# Patient Record
Sex: FEMALE | Race: WHITE | NOT HISPANIC OR LATINO | Employment: FULL TIME | URBAN - METROPOLITAN AREA
[De-identification: names, ages, dates, MRNs, and addresses within clinical notes are randomized per-mention and may not be internally consistent; named-entity substitution may affect disease eponyms.]

---

## 2023-11-03 PROBLEM — E04.9 GOITER: Status: ACTIVE | Noted: 2021-08-04

## 2023-11-03 PROBLEM — N85.01 ENDOMETRIAL HYPERPLASIA, COMPLEX: Status: ACTIVE | Noted: 2023-11-03

## 2023-11-03 PROBLEM — R73.01 IMPAIRED FASTING GLUCOSE: Status: ACTIVE | Noted: 2020-02-12

## 2023-11-03 PROBLEM — E66.01 OBESITY, CLASS III, BMI 40-49.9 (MORBID OBESITY) (MULTI): Status: ACTIVE | Noted: 2023-11-03

## 2023-11-03 PROBLEM — K80.20 GALLSTONES: Status: ACTIVE | Noted: 2018-08-15

## 2023-11-03 PROBLEM — E88.819 INSULIN RESISTANCE: Status: ACTIVE | Noted: 2023-11-03

## 2023-11-03 PROBLEM — N97.0 SECONDARY ANOVULATORY INFERTILITY: Status: ACTIVE | Noted: 2023-11-03

## 2023-11-03 PROBLEM — I10 PRIMARY HYPERTENSION: Status: ACTIVE | Noted: 2023-11-03

## 2023-11-03 PROBLEM — E04.2 NONTOXIC MULTINODULAR GOITER: Status: ACTIVE | Noted: 2023-11-03

## 2023-11-03 PROBLEM — E28.2 PCOS (POLYCYSTIC OVARIAN SYNDROME): Status: ACTIVE | Noted: 2023-11-03

## 2023-11-03 PROBLEM — E66.01 MORBID (SEVERE) OBESITY DUE TO EXCESS CALORIES (MULTI): Status: ACTIVE | Noted: 2023-11-03

## 2023-11-03 PROBLEM — N96 RECURRENT PREGNANCY LOSS WITHOUT CURRENT PREGNANCY: Status: ACTIVE | Noted: 2023-11-03

## 2023-11-03 PROBLEM — R00.2 PALPITATIONS: Status: ACTIVE | Noted: 2018-11-09

## 2023-11-03 PROBLEM — E66.813 OBESITY, CLASS III, BMI 40-49.9 (MORBID OBESITY): Status: ACTIVE | Noted: 2023-11-03

## 2023-11-03 PROBLEM — E74.39 GLUCOSE INTOLERANCE: Status: ACTIVE | Noted: 2023-11-03

## 2023-11-03 PROBLEM — N97.9 FEMALE INFERTILITY: Status: ACTIVE | Noted: 2023-11-03

## 2023-11-03 PROBLEM — E04.2 MULTIPLE THYROID NODULES: Status: ACTIVE | Noted: 2023-02-08

## 2023-11-03 PROBLEM — R01.1 HEART MURMUR: Status: ACTIVE | Noted: 2018-11-09

## 2023-11-03 PROBLEM — Z86.32 HX GESTATIONAL DIABETES: Status: ACTIVE | Noted: 2023-11-03

## 2023-11-03 PROBLEM — K21.9 CHRONIC GERD: Status: ACTIVE | Noted: 2018-08-08

## 2023-11-03 PROBLEM — K80.50 BILIARY COLIC: Status: ACTIVE | Noted: 2023-11-03

## 2023-11-03 PROBLEM — E03.8 SUBCLINICAL HYPOTHYROIDISM: Status: ACTIVE | Noted: 2018-11-19

## 2023-11-03 PROBLEM — N85.00 ENDOMETRIAL HYPERPLASIA: Status: ACTIVE | Noted: 2023-11-03

## 2023-11-03 RX ORDER — AMLODIPINE BESYLATE 5 MG/1
5 TABLET ORAL DAILY
COMMUNITY
Start: 2023-02-06 | End: 2024-02-09 | Stop reason: SDUPTHER

## 2023-11-03 RX ORDER — LEVOTHYROXINE SODIUM 50 UG/1
50 TABLET ORAL
COMMUNITY
End: 2023-12-18 | Stop reason: WASHOUT

## 2023-11-03 RX ORDER — ONDANSETRON 4 MG/1
4 TABLET, ORALLY DISINTEGRATING ORAL EVERY 6 HOURS PRN
COMMUNITY
End: 2023-12-18 | Stop reason: WASHOUT

## 2023-11-03 RX ORDER — SEMAGLUTIDE 1.34 MG/ML
1 INJECTION, SOLUTION SUBCUTANEOUS
COMMUNITY
Start: 2023-09-18 | End: 2023-12-18 | Stop reason: ALTCHOICE

## 2023-11-03 RX ORDER — MEDROXYPROGESTERONE ACETATE 10 MG/1
10 TABLET ORAL DAILY
COMMUNITY
Start: 2022-12-22 | End: 2024-06-05 | Stop reason: SDUPTHER

## 2023-11-03 RX ORDER — PRENATAL VIT 49/IRON FUM/FOLIC 6.75-0.2MG
TABLET ORAL
COMMUNITY
End: 2023-12-18 | Stop reason: WASHOUT

## 2023-11-03 RX ORDER — METFORMIN HYDROCHLORIDE 500 MG/1
500 TABLET ORAL 3 TIMES DAILY
COMMUNITY
Start: 2018-08-08

## 2023-11-03 RX ORDER — DICYCLOMINE HYDROCHLORIDE 10 MG/1
10 CAPSULE ORAL 3 TIMES DAILY
COMMUNITY
End: 2023-12-18 | Stop reason: WASHOUT

## 2023-11-03 RX ORDER — SEMAGLUTIDE 0.68 MG/ML
0.25 INJECTION, SOLUTION SUBCUTANEOUS
COMMUNITY
Start: 2023-06-12 | End: 2023-12-18 | Stop reason: ALTCHOICE

## 2023-11-06 NOTE — PROGRESS NOTES
"Patient ID: Karlee Beck is a 38 y.o. female.  Referring Physician: No referring provider defined for this encounter.  Primary Care Provider: No primary care provider on file.    Subjective    Interval History:    She is excited to travel to [place] for Thanksgiving, bowel movements and appetite are normal she was started on Ozempic managed by her endocrinologist, her weight is decreasing but is experiencing acid reflux, LMP 2023, she asked about side effects and her treatment plan/schedule. Denies thyroid issues, Hx of taking Levothyroxine, and nodules were found in her thyroid and she was told not to worry about it. Discussed Provera vs hysterectomy, stress management, recalls her last PAP was in .           Tumor History:  - 2020: CAH bordering on G1 EAC, on Megace  - 2021: EMB with progesterone effect   - 2021: EMB with progesterone effect  - 2021: EMB with hyperplasia without atypia, MARIA EUGENIA 3 noted underwent subsequent pap and ECC which were wnl  - 2022: EMB with secretory endometrium   - 10/22: Focal endometrial hyperplasia  - : EMB benign     Past medical history:  PCO S or \"prediabetes\".  Hypothyroid, treated medically  GERD  Morbid obesity        Prior surgery  Cholecystectomy  [place] teeth removal   section     OB/GYN history:   4 para 1031  Possible polycystic ovary syndrome.  Desires fertility preservation.  Would like to have another child.  Endometrial polyp suspected on saline sonohysterogram  and later undergoing polypectomy showing background endometrium with hyperplasia without atypia in May 2020  Began Provera 10 mg per day in 2020 after being referred to Dr. Evette Fournier  Endometrial biopsy 2020 showing atypical endometrial hyperplasia bordering on grade 1 cancer.     Family history:  Paternal grandmother with brain cancer  Maternal aunt with breast cancer, possibly premenopausal    Objective    BSA: 2.93 meters squared  /82 (BP " "Location: Left arm, Patient Position: Sitting, BP Cuff Size: Large adult long)   Pulse 86   Temp 36.3 °C (97.3 °F) (Temporal)   Resp 17   Ht (S) 1.76 m (5' 9.29\")   Wt (!) 176 kg (387 lb 7.3 oz)   SpO2 97%   BMI 56.74 kg/m²      Physical Exam  Constitutional:       Appearance: Normal appearance. She is normal weight.   HENT:      Head: Normocephalic.      Mouth/Throat:      Mouth: Mucous membranes are moist.   Eyes:      Pupils: Pupils are equal, round, and reactive to light.   Cardiovascular:      Rate and Rhythm: Normal rate and regular rhythm.      Heart sounds: No murmur heard.     No friction rub. No gallop.   Pulmonary:      Effort: Pulmonary effort is normal.      Breath sounds: Normal breath sounds.   Abdominal:      General: Abdomen is flat. Bowel sounds are normal.      Palpations: Abdomen is soft.   Genitourinary:     Comments: Normal external genitalia without lesions or masses  Speculum Exam: Notable for a small amount of old bloody discharge noted in the vagina and on the cervix, cervix is normal in appearance, vagina is normal in appearance.  Endometrial biopsy: Cervix cleansed with betadine, the cervix was grasped with a tenaculum on the anterior lip, The endometrial pipelle was inserted and 2 passes made. The specimen was sent for permanent section  The tenaculum was removed and hemostasis noted. She tolerated the procedure well     Musculoskeletal:         General: No swelling.   Skin:     General: Skin is warm and dry.   Neurological:      Mental Status: She is alert.         Performance Status:  Asymptomatic    Assessment/Plan   38 y.o. 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 Last EMB benign     # Atypical hyperplasia  - Discussed plan for provera 10mg daily  - EMB today, will call patient with results   - F/u visit in 6 months with biopsy at that time as long as biopsy today wnl  - She " is still intermittently deciding if she will pursue pregnancy, will continue to follow up     # Screening  - Due for a PAP test in 12/2024 and will order mammogram at next visit.        Scribe Attestation  By signing my name below, I, Eder Bradford   attest that this documentation has been prepared under the direction and in the presence of Demetria Arreguin MD.    Provider Attestation - Scribe documentation    All medical record entries made by the Scribe were at my direction and personally dictated by me. I have reviewed the chart and agree that the record accurately reflects my personal performance of the history, physical exam, discussion and plan.    Demetria Arreguin MD

## 2023-11-07 ENCOUNTER — OFFICE VISIT (OUTPATIENT)
Dept: GYNECOLOGIC ONCOLOGY | Facility: CLINIC | Age: 38
End: 2023-11-07
Payer: COMMERCIAL

## 2023-11-07 VITALS
HEIGHT: 69 IN | SYSTOLIC BLOOD PRESSURE: 137 MMHG | OXYGEN SATURATION: 97 % | WEIGHT: 293 LBS | HEART RATE: 86 BPM | DIASTOLIC BLOOD PRESSURE: 82 MMHG | BODY MASS INDEX: 43.4 KG/M2 | TEMPERATURE: 97.3 F | RESPIRATION RATE: 17 BRPM

## 2023-11-07 DIAGNOSIS — E66.01 MORBID (SEVERE) OBESITY DUE TO EXCESS CALORIES (MULTI): ICD-10-CM

## 2023-11-07 DIAGNOSIS — N85.01 ENDOMETRIAL HYPERPLASIA, COMPLEX: Primary | ICD-10-CM

## 2023-11-07 PROCEDURE — 3079F DIAST BP 80-89 MM HG: CPT | Performed by: STUDENT IN AN ORGANIZED HEALTH CARE EDUCATION/TRAINING PROGRAM

## 2023-11-07 PROCEDURE — 88305 TISSUE EXAM BY PATHOLOGIST: CPT | Mod: TC,SUR | Performed by: STUDENT IN AN ORGANIZED HEALTH CARE EDUCATION/TRAINING PROGRAM

## 2023-11-07 PROCEDURE — 88305 TISSUE EXAM BY PATHOLOGIST: CPT | Mod: TC | Performed by: STUDENT IN AN ORGANIZED HEALTH CARE EDUCATION/TRAINING PROGRAM

## 2023-11-07 PROCEDURE — 99214 OFFICE O/P EST MOD 30 MIN: CPT | Mod: 25 | Performed by: STUDENT IN AN ORGANIZED HEALTH CARE EDUCATION/TRAINING PROGRAM

## 2023-11-07 PROCEDURE — 99214 OFFICE O/P EST MOD 30 MIN: CPT | Performed by: STUDENT IN AN ORGANIZED HEALTH CARE EDUCATION/TRAINING PROGRAM

## 2023-11-07 PROCEDURE — 88305 TISSUE EXAM BY PATHOLOGIST: CPT | Performed by: PATHOLOGY

## 2023-11-07 PROCEDURE — 3075F SYST BP GE 130 - 139MM HG: CPT | Performed by: STUDENT IN AN ORGANIZED HEALTH CARE EDUCATION/TRAINING PROGRAM

## 2023-11-07 ASSESSMENT — PAIN SCALES - GENERAL: PAINLEVEL: 0-NO PAIN

## 2023-11-10 ENCOUNTER — TELEPHONE (OUTPATIENT)
Dept: GYNECOLOGIC ONCOLOGY | Facility: HOSPITAL | Age: 38
End: 2023-11-10

## 2023-11-10 LAB
LABORATORY COMMENT REPORT: NORMAL
PATH REPORT.FINAL DX SPEC: NORMAL
PATH REPORT.GROSS SPEC: NORMAL
PATH REPORT.RELEVANT HX SPEC: NORMAL
PATH REPORT.TOTAL CANCER: NORMAL

## 2023-11-10 NOTE — TELEPHONE ENCOUNTER
Phoned patient to notify her that EMB was negative and that Dr. Arreguin recommends follow up in 6 months.    Messaged Miroslava Crooks requesting she schedule 6 month follow up appointment.

## 2023-12-18 ENCOUNTER — OFFICE VISIT (OUTPATIENT)
Dept: ENDOCRINOLOGY | Facility: CLINIC | Age: 38
End: 2023-12-18
Payer: COMMERCIAL

## 2023-12-18 VITALS
DIASTOLIC BLOOD PRESSURE: 82 MMHG | SYSTOLIC BLOOD PRESSURE: 136 MMHG | BODY MASS INDEX: 55.65 KG/M2 | HEART RATE: 80 BPM | WEIGHT: 293 LBS

## 2023-12-18 DIAGNOSIS — E28.2 PCOS (POLYCYSTIC OVARIAN SYNDROME): ICD-10-CM

## 2023-12-18 DIAGNOSIS — R73.01 IMPAIRED FASTING GLUCOSE: ICD-10-CM

## 2023-12-18 DIAGNOSIS — E04.1 THYROID NODULE: ICD-10-CM

## 2023-12-18 DIAGNOSIS — E66.01 OBESITY, CLASS III, BMI 40-49.9 (MORBID OBESITY) (MULTI): Primary | ICD-10-CM

## 2023-12-18 DIAGNOSIS — E53.8 VITAMIN B 12 DEFICIENCY: ICD-10-CM

## 2023-12-18 DIAGNOSIS — Z86.32 HX GESTATIONAL DIABETES: ICD-10-CM

## 2023-12-18 DIAGNOSIS — Z13.220 SCREENING FOR LIPID DISORDERS: ICD-10-CM

## 2023-12-18 PROCEDURE — 3079F DIAST BP 80-89 MM HG: CPT | Performed by: NURSE PRACTITIONER

## 2023-12-18 PROCEDURE — 3075F SYST BP GE 130 - 139MM HG: CPT | Performed by: NURSE PRACTITIONER

## 2023-12-18 PROCEDURE — 99214 OFFICE O/P EST MOD 30 MIN: CPT | Performed by: NURSE PRACTITIONER

## 2023-12-18 RX ORDER — SEMAGLUTIDE 1.34 MG/ML
1 INJECTION, SOLUTION SUBCUTANEOUS
Qty: 9 ML | Refills: 3 | Status: SHIPPED | OUTPATIENT
Start: 2023-12-18 | End: 2024-12-17

## 2023-12-18 ASSESSMENT — LIFESTYLE VARIABLES
HOW OFTEN DO YOU HAVE A DRINK CONTAINING ALCOHOL: 2-4 TIMES A MONTH
HOW MANY STANDARD DRINKS CONTAINING ALCOHOL DO YOU HAVE ON A TYPICAL DAY: 1 OR 2

## 2023-12-18 ASSESSMENT — PAIN SCALES - GENERAL: PAINLEVEL: 0-NO PAIN

## 2023-12-18 ASSESSMENT — PATIENT HEALTH QUESTIONNAIRE - PHQ9
1. LITTLE INTEREST OR PLEASURE IN DOING THINGS: NOT AT ALL
2. FEELING DOWN, DEPRESSED OR HOPELESS: NOT AT ALL
SUM OF ALL RESPONSES TO PHQ9 QUESTIONS 1 & 2: 0

## 2023-12-18 NOTE — PROGRESS NOTES
HPI:  Presents for follow up. 37 yo with obesity, PCOS, Impaired fasting glucose, gestational diabetes, HTN, Multinodular Goiter.Previously seen Dr. King for PCOS, last OV 2019.  Before Dr. King, was under the care of End[facility] Fertility Specialist, was trying to have another child for the past 13 years, was found to have precancerous uterine polyps, taking progesterone. Will eventually need a hysterectomy.  Euthyroid, no compressive/obstructive neck complaints.  Thyroid US (Feb 2023) showing two nodules 5 x 5 x 5 mm left thyroid gland, no cystic component, no calcifications, larger nodule 8 x 4 x 5 mm, again no cyctic component, no calcifiactions. TSH 2.03 Was taking Levothyroxine 50mcg daily, 7 pills a week, while seeing an infertility specialist, no longer taking.   Currently taking Ozempic 1 mg weekly, has lost about 15 pounds since she started. Sometimes has GI side effects, which resolve.                   -PCOS Metformin max dose         -HTN Amlodipine 5mg         -WEIGHT HISTORY struggled with obesity most of her life, before pregnancy 260 lbs, lowest weight 240lbs, most weight lost at one time while in college 40pounds.         -eats 3 meals a day         -drinks only water and coffee, no sugary drinks, no pop         -excercises often takes brisk walks or follows [employer]         -emotional eater         -has tried carb counting WW/[employer] not affordable         -sleep apnea screening negative         -no depression history         -no cushinoid characteristics    /82 (BP Location: Left arm, Patient Position: Sitting)   Pulse 80   Wt (!) 172 kg (380 lb)   BMI 55.65 kg/m²     Labs:  Lab Results   Component Value Date    WBC 10.0 12/17/2022    NRBC 0 12/17/2022    RBC 4.71 12/17/2022    HGB 12.3 12/17/2022    HCT 39.9 12/17/2022     12/17/2022     Lab Results   Component Value Date    CALCIUM 9.3 12/17/2022    AST 19 12/17/2022    ALKPHOS 70 12/17/2022    BILITOT 0.9 12/17/2022    PROT  7.4 12/17/2022    ALBUMIN 4.0 12/17/2022    GLOB 3.4 12/17/2022    AGR 1.2 (L) 12/17/2022     12/17/2022    K 5.0 12/17/2022     12/17/2022    CO2 23 (L) 12/17/2022    ANIONGAP 12 12/17/2022    BUN 10 12/17/2022    CREATININE 0.6 12/17/2022    UREACREAUR 16.7 12/17/2022    GLUCOSE 93 12/17/2022    ALT 21 12/17/2022    EGFR 118 12/17/2022     Lab Results   Component Value Date    CHOL 178 12/17/2022    TRIG 103 12/17/2022    HDL 37 (L) 12/17/2022    LDLCALC 120 12/17/2022       Lab Results   Component Value Date    TSH 2.03 02/07/2023       Lab Results   Component Value Date    HGBA1C 5.4 12/17/2022         Current Outpatient Medications:     amLODIPine (Norvasc) 5 mg tablet, Take 1 tablet (5 mg) by mouth once daily., Disp: , Rfl:     cyanocobalamin, vitamin B-12, (VITAMIN B-12 ORAL), B12, Disp: , Rfl:     medroxyPROGESTERone (Provera) 10 mg tablet, Take 1 tablet (10 mg) by mouth once daily., Disp: , Rfl:     metFORMIN (Glucophage) 500 mg tablet, Take 1 tablet (500 mg) by mouth 3 times a day., Disp: , Rfl:     multivit-min/ferrous fumarate (MULTI VITAMIN ORAL), 0 Refill(s), Type: Maintenance, Disp: , Rfl:     semaglutide (Ozempic) 1 mg/dose (4 mg/3 mL) pen injector, Inject 1 mg under the skin 1 (one) time per week., Disp: 9 mL, Rfl: 3    Review of Systems:  Cardiology: Lightheadedness-denies.  Chest pain-denies.  Leg edema-denies.  Palpitations-denies.  Respiratory: Cough-denies. Shortness of breath-denies.  Wheezing-denies.  Gastroenterology: Constipation-denies.  Diarrhea-denies.  Heartburn-denies.  Endocrinology: Cold intolerance-denies.  Heat intolerance-denies.  Sweats-denies.  Neurology: Headache-denies.  Tremor-denies.  Neuropathy in extremities-denies.  Psychology: Low energy-denies.  Irritability-denies.  Sleep disturbances-denies.    Physical Exam:  General Appearance: pleasant, cooperative, no acute distress  HEENT: no chemosis, no proptosis, no lid lag, no lid retraction  Neck: no  lymphadenopathy, no thyromegaly, no dominant thyroid nodules  Heart: no murmurs, regular rate and rhythm, S1 and S2  Lungs: no wheezes, no rhonci, no rales  Extremities: no lower extremity swelling    Assessment and Plan:  1. Obesity, Class III, BMI 40-49.9 (morbid obesity) (CMS/[facility])  -tolerating GLP1, continue 1 mg dose for now  -some GI side effects that resolve with time  -reviewed reportable side effects    2. PCOS (polycystic ovarian syndrome)  -maintained on Metformin  -check B12 level    3. Hx gestational diabetes      4. Impaired fasting glucose  -in between PCP will have all labs done for her before her next visit in April    Follow Up: CNP 4 months    -labs/tests/notes reviewed  -reviewed and counseled patient on medication monitoring and side effects

## 2024-02-09 ENCOUNTER — TELEPHONE (OUTPATIENT)
Dept: PRIMARY CARE | Facility: CLINIC | Age: 39
End: 2024-02-09
Payer: COMMERCIAL

## 2024-02-09 DIAGNOSIS — I10 PRIMARY HYPERTENSION: Primary | ICD-10-CM

## 2024-02-09 DIAGNOSIS — E03.8 SUBCLINICAL HYPOTHYROIDISM: ICD-10-CM

## 2024-02-09 DIAGNOSIS — R73.01 IMPAIRED FASTING GLUCOSE: ICD-10-CM

## 2024-02-09 RX ORDER — AMLODIPINE BESYLATE 5 MG/1
5 TABLET ORAL DAILY
Qty: 30 TABLET | Refills: 0 | Status: SHIPPED | OUTPATIENT
Start: 2024-02-09 | End: 2024-03-07 | Stop reason: SDUPTHER

## 2024-02-16 ENCOUNTER — HOSPITAL ENCOUNTER (OUTPATIENT)
Dept: RADIOLOGY | Facility: HOSPITAL | Age: 39
Discharge: HOME | End: 2024-02-16
Payer: COMMERCIAL

## 2024-02-16 ENCOUNTER — LAB (OUTPATIENT)
Dept: LAB | Facility: LAB | Age: 39
End: 2024-02-16
Payer: COMMERCIAL

## 2024-02-16 DIAGNOSIS — Z13.220 SCREENING FOR LIPID DISORDERS: ICD-10-CM

## 2024-02-16 DIAGNOSIS — R73.01 IMPAIRED FASTING GLUCOSE: ICD-10-CM

## 2024-02-16 DIAGNOSIS — E04.1 THYROID NODULE: ICD-10-CM

## 2024-02-16 DIAGNOSIS — E03.8 SUBCLINICAL HYPOTHYROIDISM: ICD-10-CM

## 2024-02-16 DIAGNOSIS — E66.01 OBESITY, CLASS III, BMI 40-49.9 (MORBID OBESITY) (MULTI): ICD-10-CM

## 2024-02-16 DIAGNOSIS — I10 PRIMARY HYPERTENSION: ICD-10-CM

## 2024-02-16 DIAGNOSIS — E53.8 VITAMIN B 12 DEFICIENCY: ICD-10-CM

## 2024-02-16 DIAGNOSIS — Z86.32 HX GESTATIONAL DIABETES: ICD-10-CM

## 2024-02-16 LAB
ALBUMIN SERPL BCP-MCNC: 4 G/DL (ref 3.4–5)
ALP SERPL-CCNC: 58 U/L (ref 33–110)
ALT SERPL W P-5'-P-CCNC: 14 U/L (ref 7–45)
ANION GAP SERPL CALC-SCNC: 11 MMOL/L (ref 10–20)
APPEARANCE UR: ABNORMAL
AST SERPL W P-5'-P-CCNC: 13 U/L (ref 9–39)
BACTERIA #/AREA URNS AUTO: ABNORMAL /HPF
BASOPHILS # BLD AUTO: 0.03 X10*3/UL (ref 0–0.1)
BASOPHILS NFR BLD AUTO: 0.3 %
BILIRUB SERPL-MCNC: 0.9 MG/DL (ref 0–1.2)
BILIRUB UR STRIP.AUTO-MCNC: NEGATIVE MG/DL
BUN SERPL-MCNC: 12 MG/DL (ref 6–23)
CALCIUM SERPL-MCNC: 9.1 MG/DL (ref 8.6–10.3)
CHLORIDE SERPL-SCNC: 105 MMOL/L (ref 98–107)
CHOLEST SERPL-MCNC: 152 MG/DL (ref 0–199)
CHOLESTEROL/HDL RATIO: 4.3
CO2 SERPL-SCNC: 25 MMOL/L (ref 21–32)
COLOR UR: YELLOW
CREAT SERPL-MCNC: 0.63 MG/DL (ref 0.5–1.05)
EGFRCR SERPLBLD CKD-EPI 2021: >90 ML/MIN/1.73M*2
EOSINOPHIL # BLD AUTO: 0.57 X10*3/UL (ref 0–0.7)
EOSINOPHIL NFR BLD AUTO: 6.4 %
ERYTHROCYTE [DISTWIDTH] IN BLOOD BY AUTOMATED COUNT: 14.2 % (ref 11.5–14.5)
EST. AVERAGE GLUCOSE BLD GHB EST-MCNC: 105 MG/DL
GLUCOSE SERPL-MCNC: 85 MG/DL (ref 74–99)
GLUCOSE UR STRIP.AUTO-MCNC: NEGATIVE MG/DL
HBA1C MFR BLD: 5.3 %
HCT VFR BLD AUTO: 41.1 % (ref 36–46)
HDLC SERPL-MCNC: 35.4 MG/DL
HGB BLD-MCNC: 13 G/DL (ref 12–16)
IMM GRANULOCYTES # BLD AUTO: 0.03 X10*3/UL (ref 0–0.7)
IMM GRANULOCYTES NFR BLD AUTO: 0.3 % (ref 0–0.9)
KETONES UR STRIP.AUTO-MCNC: NEGATIVE MG/DL
LDLC SERPL CALC-MCNC: 98 MG/DL
LEUKOCYTE ESTERASE UR QL STRIP.AUTO: ABNORMAL
LYMPHOCYTES # BLD AUTO: 2.26 X10*3/UL (ref 1.2–4.8)
LYMPHOCYTES NFR BLD AUTO: 25.4 %
MCH RBC QN AUTO: 26.6 PG (ref 26–34)
MCHC RBC AUTO-ENTMCNC: 31.6 G/DL (ref 32–36)
MCV RBC AUTO: 84 FL (ref 80–100)
MONOCYTES # BLD AUTO: 0.43 X10*3/UL (ref 0.1–1)
MONOCYTES NFR BLD AUTO: 4.8 %
MUCOUS THREADS #/AREA URNS AUTO: ABNORMAL /LPF
NEUTROPHILS # BLD AUTO: 5.57 X10*3/UL (ref 1.2–7.7)
NEUTROPHILS NFR BLD AUTO: 62.8 %
NITRITE UR QL STRIP.AUTO: NEGATIVE
NON HDL CHOLESTEROL: 117 MG/DL (ref 0–149)
NRBC BLD-RTO: 0 /100 WBCS (ref 0–0)
PH UR STRIP.AUTO: 5 [PH]
PLATELET # BLD AUTO: 366 X10*3/UL (ref 150–450)
POTASSIUM SERPL-SCNC: 4.4 MMOL/L (ref 3.5–5.3)
PROT SERPL-MCNC: 7.1 G/DL (ref 6.4–8.2)
PROT UR STRIP.AUTO-MCNC: ABNORMAL MG/DL
RBC # BLD AUTO: 4.88 X10*6/UL (ref 4–5.2)
RBC # UR STRIP.AUTO: NEGATIVE /UL
RBC #/AREA URNS AUTO: ABNORMAL /HPF
SODIUM SERPL-SCNC: 137 MMOL/L (ref 136–145)
SP GR UR STRIP.AUTO: 1.02
SQUAMOUS #/AREA URNS AUTO: ABNORMAL /HPF
TRIGL SERPL-MCNC: 91 MG/DL (ref 0–149)
TSH SERPL-ACNC: 2.7 MIU/L (ref 0.44–3.98)
UROBILINOGEN UR STRIP.AUTO-MCNC: <2 MG/DL
VIT B12 SERPL-MCNC: 371 PG/ML (ref 211–911)
VLDL: 18 MG/DL (ref 0–40)
WBC # BLD AUTO: 8.9 X10*3/UL (ref 4.4–11.3)
WBC #/AREA URNS AUTO: ABNORMAL /HPF

## 2024-02-16 PROCEDURE — 83036 HEMOGLOBIN GLYCOSYLATED A1C: CPT

## 2024-02-16 PROCEDURE — 85025 COMPLETE CBC W/AUTO DIFF WBC: CPT

## 2024-02-16 PROCEDURE — 84443 ASSAY THYROID STIM HORMONE: CPT

## 2024-02-16 PROCEDURE — 80061 LIPID PANEL: CPT

## 2024-02-16 PROCEDURE — 80053 COMPREHEN METABOLIC PANEL: CPT

## 2024-02-16 PROCEDURE — 76536 US EXAM OF HEAD AND NECK: CPT

## 2024-02-16 PROCEDURE — 82607 VITAMIN B-12: CPT

## 2024-02-16 PROCEDURE — 81001 URINALYSIS AUTO W/SCOPE: CPT

## 2024-03-05 PROBLEM — K80.20 GALLSTONES: Status: RESOLVED | Noted: 2018-08-15 | Resolved: 2024-03-05

## 2024-03-05 PROBLEM — N96 RECURRENT PREGNANCY LOSS WITHOUT CURRENT PREGNANCY: Status: RESOLVED | Noted: 2023-11-03 | Resolved: 2024-03-05

## 2024-03-05 PROBLEM — R00.2 PALPITATIONS: Status: RESOLVED | Noted: 2018-11-09 | Resolved: 2024-03-05

## 2024-03-05 PROBLEM — E66.813 OBESITY, CLASS III, BMI 40-49.9 (MORBID OBESITY): Status: RESOLVED | Noted: 2023-11-03 | Resolved: 2024-03-05

## 2024-03-05 PROBLEM — E88.819 INSULIN RESISTANCE: Status: RESOLVED | Noted: 2023-11-03 | Resolved: 2024-03-05

## 2024-03-05 PROBLEM — K80.50 BILIARY COLIC: Status: RESOLVED | Noted: 2023-11-03 | Resolved: 2024-03-05

## 2024-03-05 PROBLEM — E66.01 OBESITY, CLASS III, BMI 40-49.9 (MORBID OBESITY) (MULTI): Status: RESOLVED | Noted: 2023-11-03 | Resolved: 2024-03-05

## 2024-03-05 PROBLEM — N85.01 ENDOMETRIAL HYPERPLASIA, COMPLEX: Status: RESOLVED | Noted: 2023-11-03 | Resolved: 2024-03-05

## 2024-03-05 NOTE — PROGRESS NOTES
"Subjective   Patient ID: Karlee Beck is a 39 y.o. female who presents for Annual Exam.    HPI   Karlee presents for CPE. She was previously a patient of [facility].  She has not been here in over a year.   PMH and H reviewed and updated. 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  She is  and has a 14-year-old daughter. She has no specific concerns today.  She feels she may have ADHD but is not interested in medications.  Her daughter was recently diagnosed with ADHD.  Predraw is reviewed.  All is unremarkable.  Consultants:   Sees a therapist in [place].                        Endocrinology for obesity, polycystic ovary syndrome, impaired fasting glucose and goiter    GYN- She sees a gynecologist. She has seen an oncologist due to uterine hyperplasia   She has a history of infertility.  She is on cyclic progesterone to keep her uterine lining from building up.      Review of Systems  Constitutional Symptoms: She is negative for fever, loss of appetite, headaches, fatigue.   Eyes: She is negative for loss and blurring of vision  Cardiovascular: She is negative for chest pain palpitations, edema, claudication.   Respiratory: She is negative for shortness of breath, coughing.   Gastrointestinal: She is negative for indigestion, abdominal pain,  blood in stool.   Female Genitourinary: She is negative for  incontinence, frequency, nocturia, dysuria  Musculoskeletal: She is negative for joint pain, joint swelling, myalgias, cramps.   Integumentary: She is negative for change in mole, skin trouble or rash, hives, itching,   Neurological: She is negative for numbness, tingling, weakness, dizziness, memory loss.   Psychiatric: She does have some anxiety but feels that it is controlled.  She reads and does crafts to relax.  Endocrine: She is negative for weight gain, excessive sweating, polyuria, polydipsia, polyphagia.       Objective   /88   Pulse 78   Ht 1.753 m (5' 9\")   Wt (!) 170 kg (375 " lb)   SpO2 100%   BMI 55.38 kg/m²     Physical Exam  General Appearance: Comfortable. She is well nourished, and well developed.   Eyes: PERRLA, EOMI, conjunctiva and sclerae clear.  Ears, Nose, Mouth, Throat: Bilateral canals are normal. Both tympanic membranes are pearly gray and landmarks normal . Posterior pharynx WNL. Tonsils WNL.   NOSE: Nasal mucosa in both nostrils reveals no polyps, ulcerations, or lesions. Oral mucosa reveals no abnormalities   Neck: Neck reveals supple, no adenopathy, no thyromegaly, or carotid bruits.  Chest: Lungs are clear to auscultation bilaterally with no wheezes, rales, or rhonchi.  Cardiovascular: RRR without MRG. No edema.  DP/PT palpable bilaterally.   Abdomen: Abdomen is soft, NT, ND with no masses. No guarding or rigidity  Musculoskeletal: 5/5 and equal strength in bilateral upper and lower extremities.  Skin: Skin reveals good turgor and no rashes .  Neurological: Neuro: Intact and non-focal. Cranial nerves II - XII are grossly intact.  Psychiatric:  Patient's mood is appropriate.      Assessment/Plan   Diagnoses and all orders for this visit:  Well adult exam  Primary hypertension  -     amLODIPine (Norvasc) 5 mg tablet; Take 1 tablet (5 mg) by mouth once daily.  -     ECG 12 lead (Clinic Performed)  Visit for screening mammogram

## 2024-03-07 ENCOUNTER — OFFICE VISIT (OUTPATIENT)
Dept: PRIMARY CARE | Facility: CLINIC | Age: 39
End: 2024-03-07
Payer: COMMERCIAL

## 2024-03-07 VITALS
SYSTOLIC BLOOD PRESSURE: 138 MMHG | BODY MASS INDEX: 43.4 KG/M2 | WEIGHT: 293 LBS | DIASTOLIC BLOOD PRESSURE: 88 MMHG | HEART RATE: 78 BPM | HEIGHT: 69 IN | OXYGEN SATURATION: 100 %

## 2024-03-07 DIAGNOSIS — Z00.00 WELL ADULT EXAM: Primary | ICD-10-CM

## 2024-03-07 DIAGNOSIS — I10 PRIMARY HYPERTENSION: ICD-10-CM

## 2024-03-07 PROCEDURE — 93000 ELECTROCARDIOGRAM COMPLETE: CPT | Performed by: FAMILY MEDICINE

## 2024-03-07 PROCEDURE — 1036F TOBACCO NON-USER: CPT | Performed by: FAMILY MEDICINE

## 2024-03-07 PROCEDURE — 3075F SYST BP GE 130 - 139MM HG: CPT | Performed by: FAMILY MEDICINE

## 2024-03-07 PROCEDURE — 99396 PREV VISIT EST AGE 40-64: CPT | Performed by: FAMILY MEDICINE

## 2024-03-07 PROCEDURE — 3079F DIAST BP 80-89 MM HG: CPT | Performed by: FAMILY MEDICINE

## 2024-03-07 RX ORDER — AMLODIPINE BESYLATE 5 MG/1
5 TABLET ORAL DAILY
Qty: 30 TABLET | Refills: 0 | OUTPATIENT
Start: 2024-03-07

## 2024-03-07 RX ORDER — AMLODIPINE BESYLATE 5 MG/1
5 TABLET ORAL DAILY
Qty: 90 TABLET | Refills: 3 | Status: SHIPPED | OUTPATIENT
Start: 2024-03-07

## 2024-03-07 ASSESSMENT — PROMIS GLOBAL HEALTH SCALE
RATE_GENERAL_HEALTH: GOOD
RATE_QUALITY_OF_LIFE: GOOD
RATE_MENTAL_HEALTH: FAIR
RATE_SOCIAL_SATISFACTION: FAIR
RATE_PHYSICAL_HEALTH: GOOD
CARRYOUT_SOCIAL_ACTIVITIES: FAIR
CARRYOUT_PHYSICAL_ACTIVITIES: COMPLETELY
RATE_AVERAGE_FATIGUE: MILD
RATE_AVERAGE_PAIN: 0
EMOTIONAL_PROBLEMS: SOMETIMES

## 2024-03-07 ASSESSMENT — PATIENT HEALTH QUESTIONNAIRE - PHQ9
SUM OF ALL RESPONSES TO PHQ9 QUESTIONS 1 AND 2: 0
2. FEELING DOWN, DEPRESSED OR HOPELESS: NOT AT ALL
1. LITTLE INTEREST OR PLEASURE IN DOING THINGS: NOT AT ALL

## 2024-03-07 ASSESSMENT — PAIN SCALES - GENERAL: PAINLEVEL: 0-NO PAIN

## 2024-03-07 NOTE — PATIENT INSTRUCTIONS
It was nice to meet you Karlee.  Continue your current medications.  As long as your consultants are seeing you regularly and checking blood pressures I will plan to see you back in 1 year.

## 2024-04-18 ENCOUNTER — OFFICE VISIT (OUTPATIENT)
Dept: ENDOCRINOLOGY | Facility: CLINIC | Age: 39
End: 2024-04-18
Payer: COMMERCIAL

## 2024-04-18 VITALS
DIASTOLIC BLOOD PRESSURE: 76 MMHG | HEART RATE: 80 BPM | WEIGHT: 293 LBS | SYSTOLIC BLOOD PRESSURE: 128 MMHG | BODY MASS INDEX: 54.61 KG/M2

## 2024-04-18 DIAGNOSIS — E66.01 OBESITY, CLASS III, BMI 40-49.9 (MORBID OBESITY) (MULTI): ICD-10-CM

## 2024-04-18 DIAGNOSIS — E04.1 THYROID NODULE: ICD-10-CM

## 2024-04-18 DIAGNOSIS — E28.2 PCOS (POLYCYSTIC OVARIAN SYNDROME): ICD-10-CM

## 2024-04-18 DIAGNOSIS — E88.819 INSULIN RESISTANCE: Primary | ICD-10-CM

## 2024-04-18 PROCEDURE — 99213 OFFICE O/P EST LOW 20 MIN: CPT | Performed by: NURSE PRACTITIONER

## 2024-04-18 PROCEDURE — 3074F SYST BP LT 130 MM HG: CPT | Performed by: NURSE PRACTITIONER

## 2024-04-18 PROCEDURE — 3078F DIAST BP <80 MM HG: CPT | Performed by: NURSE PRACTITIONER

## 2024-04-18 ASSESSMENT — ENCOUNTER SYMPTOMS: DEPRESSION: 0

## 2024-04-18 ASSESSMENT — PAIN SCALES - GENERAL: PAINLEVEL: 0-NO PAIN

## 2024-04-18 NOTE — PROGRESS NOTES
HPI   Presents for follow up. 38 yo with Insulin Resistance, obesity, PCOS, gestational diabetes, HTN, Multinodular Goiter. Previously seen Dr. King for PCOS, last OV 2019.  Before Dr. King, was under the care of Endo Fertility Specialist, was trying to have another child for the past 13 years, was found to have precancerous uterine polyps, taking progesterone. Will eventually need a hysterectomy. Euthyroid, no compressive/obstructive neck complaints.  Thyroid US (Feb 2023) showing two nodules 5 x 5 x 5 mm left thyroid gland, no cystic component, no calcifications, larger nodule 8 x 4 x 5 mm, again no cyctic component, no calcifiactions. TSH 2.03 Was taking Levothyroxine 50mcg daily, 7 pills a week, while seeing an infertility specialist, no longer taking.   Currently taking Ozempic 1 mg weekly, start weight 390 pounds 369.8 Sometimes has GI side effects, which resolve.       Current Outpatient Medications:     amLODIPine (Norvasc) 5 mg tablet, Take 1 tablet (5 mg) by mouth once daily., Disp: 90 tablet, Rfl: 3    cyanocobalamin, vitamin B-12, (VITAMIN B-12 ORAL), B12, Disp: , Rfl:     medroxyPROGESTERone (Provera) 10 mg tablet, Take 1 tablet (10 mg) by mouth once daily., Disp: , Rfl:     metFORMIN (Glucophage) 500 mg tablet, Take 1 tablet (500 mg) by mouth 3 times a day., Disp: , Rfl:     multivit-min/ferrous fumarate (MULTI VITAMIN ORAL), 0 Refill(s), Type: Maintenance, Disp: , Rfl:     semaglutide (Ozempic) 1 mg/dose (4 mg/3 mL) pen injector, Inject 1 mg under the skin 1 (one) time per week., Disp: 9 mL, Rfl: 3      Allergies as of 04/18/2024 - Reviewed 04/18/2024   Allergen Reaction Noted    Azithromycin Insomnia, Other, and Unknown 11/03/2023    Potassium sulfate Unknown 11/03/2023         Review of Systems   Cardiology: Lightheadedness-denies.  Chest pain-denies.  Leg edema-denies.  Palpitations-denies.  Respiratory: Cough-denies. Shortness of breath-denies.  Wheezing-denies.  Gastroenterology:  Constipation-denies.  Diarrhea-denies.  Heartburn-denies.  Endocrinology: Cold intolerance-denies.  Heat intolerance-denies.  Sweats-denies.  Neurology: Headache-denies.  Tremor-denies.  Neuropathy in extremities-denies.  Psychology: Low energy-denies.  Irritability-denies.  Sleep disturbances-denies.      /76 (BP Location: Left arm, Patient Position: Sitting)   Pulse 80   Wt (!) 168 kg (369 lb 12.8 oz)   BMI 54.61 kg/m²       Labs:  Lab Results   Component Value Date    WBC 8.9 02/16/2024    NRBC 0.0 02/16/2024    RBC 4.88 02/16/2024    HGB 13.0 02/16/2024    HCT 41.1 02/16/2024     02/16/2024     Lab Results   Component Value Date    CALCIUM 9.1 02/16/2024    AST 13 02/16/2024    ALKPHOS 58 02/16/2024    BILITOT 0.9 02/16/2024    PROT 7.1 02/16/2024    ALBUMIN 4.0 02/16/2024    GLOB 3.4 12/17/2022    AGR 1.2 (L) 12/17/2022     02/16/2024    K 4.4 02/16/2024     02/16/2024    CO2 25 02/16/2024    ANIONGAP 11 02/16/2024    BUN 12 02/16/2024    CREATININE 0.63 02/16/2024    UREACREAUR 16.7 12/17/2022    GLUCOSE 85 02/16/2024    ALT 14 02/16/2024    EGFR >90 02/16/2024     Lab Results   Component Value Date    CHOL 152 02/16/2024    TRIG 91 02/16/2024    HDL 35.4 02/16/2024    LDLCALC 98 02/16/2024     Lab Results   Component Value Date    TSH 2.70 02/16/2024     Lab Results   Component Value Date    LCWJMZNN27 371 02/16/2024     Lab Results   Component Value Date    HGBA1C 5.3 02/16/2024         Physical Exam   General Appearance: pleasant, cooperative, no acute distress  HEENT: no chemosis, no proptosis, no lid lag, no lid retraction  Neck: no lymphadenopathy, no thyromegaly, no dominant thyroid nodules  Heart: no murmurs, regular rate and rhythm, S1 and S2  Lungs: no wheezes, no rhonci, no rales  Extremities: no lower extremity swelling      Assessment/Plan   1. Insulin resistance  -continue with GLP1 therapy  -can consider increase dose to 2 mg slowly to prevent undesired side effects  and maximize weight loss  -continues with Metformin 1.5 mg daily     2. PCOS (polycystic ovarian syndrome)  -maintains Metformin    3. Obesity, Class III, BMI 40-49.9 (morbid obesity) (Multi)  -continue with GLP1 treatment and plan to maximize dose      4. Thyroid Nodule  -no concerning changes from most recent US  -can plan to repeat in 3 yeas or sooner if develops obstructive symptoms    Follow Up: 4 months CNP    -labs/tests/notes reviewed  -reviewed and counseled patient on medication monitoring and side effects

## 2024-05-13 NOTE — PROGRESS NOTES
"Patient ID: Karlee Beck is a 39 y.o. female.  Referring Physician: No referring provider defined for this encounter.  Primary Care Provider: Divya Kent MD    Subjective    Interval History:  She had 2 full period cycles, denies spotting and lower extremity edema, otherwise she requested a mammogram order.    She denies fever, chills, chest pain, SOB, nausea, vomiting, diarrhea, constipation, dysuria, or any other concerning signs of symptoms.      Tumor History:  - 2020: CAH bordering on G1 EAC, on Megace  - 2021: EMB with progesterone effect   - 2021: EMB with progesterone effect  - 2021: EMB with hyperplasia without atypia, MARIA EUGENIA 3 noted underwent subsequent pap and ECC which were wnl  - 2022: EMB with secretory endometrium   - 10/22: Focal endometrial hyperplasia  - : EMB benign  -  EMB benign     Past medical history:  PCO S or \"prediabetes\".  Hypothyroid, treated medically  GERD  Morbid obesity        Prior surgery  Cholecystectomy  [place] teeth removal   section     OB/GYN history:   4 para 1031  Possible polycystic ovary syndrome.  Desires fertility preservation.  Would like to have another child.  Endometrial polyp suspected on saline sonohysterogram  and later undergoing polypectomy showing background endometrium with hyperplasia without atypia in May 2020  Began Provera 10 mg per day in 2020 after being referred to Dr. Evette Fournier  Endometrial biopsy 2020 showing atypical endometrial hyperplasia bordering on grade 1 cancer.     Family history:  Paternal grandmother with brain cancer  Maternal aunt with breast cancer, possibly premenopausal    Objective    BSA: 2.85 meters squared  /83 (BP Location: Right arm, Patient Position: Sitting, BP Cuff Size: Large adult)   Pulse 78   Temp 36.6 °C (97.9 °F) (Temporal)   Resp 18   Wt (!) 167 kg (369 lb) Comment: Stated Weight  SpO2 97%   BMI 54.49 kg/m²      Physical Exam  Constitutional:       " Appearance: Normal appearance. She is normal weight.   HENT:      Head: Normocephalic.      Mouth/Throat:      Mouth: Mucous membranes are moist.   Eyes:      Pupils: Pupils are equal, round, and reactive to light.   Cardiovascular:      Rate and Rhythm: Normal rate and regular rhythm.      Heart sounds: No murmur heard.     No friction rub. No gallop.   Pulmonary:      Effort: Pulmonary effort is normal.      Breath sounds: Normal breath sounds.   Abdominal:      General: Abdomen is flat. Bowel sounds are normal.      Palpations: Abdomen is soft.   Genitourinary:     Comments: Normal external genitalia without lesions or masses  Speculum Exam: Notable for a small amount of old bloody discharge noted in the vagina and on the cervix, cervix is normal in appearance, vagina is normal in appearance.  Endometrial biopsy: Cervix cleansed with betadine, the cervix was grasped with a tenaculum on the anterior lip, The endometrial pipelle was inserted and 2 passes made. The specimen was sent for permanent section  The tenaculum was removed and hemostasis noted. She tolerated the procedure well     Musculoskeletal:         General: No swelling.   Skin:     General: Skin is warm and dry.   Neurological:      Mental Status: She is alert.         Performance Status:  Asymptomatic    Assessment/Plan   39 y.o. 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 Last EMB benign     # Atypical hyperplasia  - Discussed plan for provera 10mg daily  - EMB today, will call patient with results   - F/u visit in 6 months, if EMB today wnl will plan on yearly EMB  - She is still intermittently deciding if she will pursue pregnancy, will continue to follow up     # Screening  - Due for a PAP test in 12/2026  - Mammogram ordered      Scribe Attestation  By signing my name below, [email or web address], Scribe   attest that this documentation has been  prepared under the direction and in the presence of Demetria Arreguin MD.     Provider Attestation - Scribe documentation    All medical record entries made by the Scribe were at my direction and personally dictated by me. I have reviewed the chart and agree that the record accurately reflects my personal performance of the history, physical exam, discussion and plan.    Demetria Arreguin MD

## 2024-05-14 ENCOUNTER — OFFICE VISIT (OUTPATIENT)
Dept: GYNECOLOGIC ONCOLOGY | Facility: CLINIC | Age: 39
End: 2024-05-14
Payer: COMMERCIAL

## 2024-05-14 VITALS
RESPIRATION RATE: 18 BRPM | WEIGHT: 293 LBS | OXYGEN SATURATION: 97 % | SYSTOLIC BLOOD PRESSURE: 136 MMHG | HEART RATE: 78 BPM | BODY MASS INDEX: 54.49 KG/M2 | TEMPERATURE: 97.9 F | DIASTOLIC BLOOD PRESSURE: 83 MMHG

## 2024-05-14 DIAGNOSIS — Z12.39 BREAST SCREENING: ICD-10-CM

## 2024-05-14 DIAGNOSIS — N85.00 ENDOMETRIAL HYPERPLASIA: Primary | ICD-10-CM

## 2024-05-14 PROCEDURE — 58100 BIOPSY OF UTERUS LINING: CPT | Performed by: STUDENT IN AN ORGANIZED HEALTH CARE EDUCATION/TRAINING PROGRAM

## 2024-05-14 PROCEDURE — 88305 TISSUE EXAM BY PATHOLOGIST: CPT | Performed by: PATHOLOGY

## 2024-05-14 PROCEDURE — 99214 OFFICE O/P EST MOD 30 MIN: CPT | Performed by: STUDENT IN AN ORGANIZED HEALTH CARE EDUCATION/TRAINING PROGRAM

## 2024-05-14 PROCEDURE — 3075F SYST BP GE 130 - 139MM HG: CPT | Performed by: STUDENT IN AN ORGANIZED HEALTH CARE EDUCATION/TRAINING PROGRAM

## 2024-05-14 PROCEDURE — 3079F DIAST BP 80-89 MM HG: CPT | Performed by: STUDENT IN AN ORGANIZED HEALTH CARE EDUCATION/TRAINING PROGRAM

## 2024-05-14 PROCEDURE — 1036F TOBACCO NON-USER: CPT | Performed by: STUDENT IN AN ORGANIZED HEALTH CARE EDUCATION/TRAINING PROGRAM

## 2024-05-14 PROCEDURE — 88305 TISSUE EXAM BY PATHOLOGIST: CPT | Mod: TC,GEALAB | Performed by: STUDENT IN AN ORGANIZED HEALTH CARE EDUCATION/TRAINING PROGRAM

## 2024-05-14 ASSESSMENT — PAIN SCALES - GENERAL: PAINLEVEL: 0-NO PAIN

## 2024-05-22 LAB
LABORATORY COMMENT REPORT: NORMAL
PATH REPORT.COMMENTS IMP SPEC: NORMAL
PATH REPORT.FINAL DX SPEC: NORMAL
PATH REPORT.GROSS SPEC: NORMAL
PATH REPORT.RELEVANT HX SPEC: NORMAL
PATH REPORT.TOTAL CANCER: NORMAL

## 2024-06-05 DIAGNOSIS — N85.00 ENDOMETRIAL HYPERPLASIA: ICD-10-CM

## 2024-06-05 DIAGNOSIS — K21.9 CHRONIC GERD: Primary | ICD-10-CM

## 2024-06-05 RX ORDER — MEDROXYPROGESTERONE ACETATE 10 MG/1
10 TABLET ORAL DAILY
Qty: 30 TABLET | Refills: 11 | Status: SHIPPED | OUTPATIENT
Start: 2024-06-05 | End: 2025-06-05

## 2024-06-15 DIAGNOSIS — E28.2 POLYCYSTIC OVARIAN SYNDROME: ICD-10-CM

## 2024-06-17 RX ORDER — METFORMIN HYDROCHLORIDE 500 MG/1
TABLET, EXTENDED RELEASE ORAL
Qty: 360 TABLET | Refills: 3 | Status: SHIPPED | OUTPATIENT
Start: 2024-06-17

## 2024-08-19 ENCOUNTER — APPOINTMENT (OUTPATIENT)
Dept: ENDOCRINOLOGY | Facility: CLINIC | Age: 39
End: 2024-08-19
Payer: COMMERCIAL

## 2024-08-19 VITALS
DIASTOLIC BLOOD PRESSURE: 70 MMHG | HEART RATE: 82 BPM | HEIGHT: 69 IN | BODY MASS INDEX: 43.4 KG/M2 | SYSTOLIC BLOOD PRESSURE: 110 MMHG | WEIGHT: 293 LBS

## 2024-08-19 DIAGNOSIS — E28.2 PCOS (POLYCYSTIC OVARIAN SYNDROME): ICD-10-CM

## 2024-08-19 DIAGNOSIS — E88.819 INSULIN RESISTANCE: Primary | ICD-10-CM

## 2024-08-19 DIAGNOSIS — E04.1 THYROID NODULE: ICD-10-CM

## 2024-08-19 DIAGNOSIS — E66.01 OBESITY, CLASS III, BMI 40-49.9 (MORBID OBESITY) (MULTI): ICD-10-CM

## 2024-08-19 PROCEDURE — 1036F TOBACCO NON-USER: CPT | Performed by: NURSE PRACTITIONER

## 2024-08-19 PROCEDURE — 3078F DIAST BP <80 MM HG: CPT | Performed by: NURSE PRACTITIONER

## 2024-08-19 PROCEDURE — 3074F SYST BP LT 130 MM HG: CPT | Performed by: NURSE PRACTITIONER

## 2024-08-19 PROCEDURE — 3008F BODY MASS INDEX DOCD: CPT | Performed by: NURSE PRACTITIONER

## 2024-08-19 PROCEDURE — 99213 OFFICE O/P EST LOW 20 MIN: CPT | Performed by: NURSE PRACTITIONER

## 2024-08-19 ASSESSMENT — LIFESTYLE VARIABLES
HOW OFTEN DO YOU HAVE SIX OR MORE DRINKS ON ONE OCCASION: NEVER
SKIP TO QUESTIONS 9-10: 1
AUDIT-C TOTAL SCORE: 1
HOW MANY STANDARD DRINKS CONTAINING ALCOHOL DO YOU HAVE ON A TYPICAL DAY: 1 OR 2
HOW OFTEN DO YOU HAVE A DRINK CONTAINING ALCOHOL: MONTHLY OR LESS

## 2024-08-19 ASSESSMENT — ENCOUNTER SYMPTOMS
LOSS OF SENSATION IN FEET: 0
DEPRESSION: 0
OCCASIONAL FEELINGS OF UNSTEADINESS: 0

## 2024-08-19 ASSESSMENT — PAIN SCALES - GENERAL: PAINLEVEL: 0-NO PAIN

## 2024-08-19 ASSESSMENT — PATIENT HEALTH QUESTIONNAIRE - PHQ9
2. FEELING DOWN, DEPRESSED OR HOPELESS: NOT AT ALL
1. LITTLE INTEREST OR PLEASURE IN DOING THINGS: NOT AT ALL
SUM OF ALL RESPONSES TO PHQ9 QUESTIONS 1 & 2: 0

## 2024-08-19 NOTE — PROGRESS NOTES
HPI   Presents for follow up. 38 yo with Insulin Resistance, obesity, PCOS, gestational diabetes, HTN, Multinodular Goiter. Euthyroid, no compressive/obstructive neck complaints. Currently taking Ozempic 1 mg weekly, start weight 390 pounds current 361 pounds, sometimes has GI side effects, which resolve. Also continues with Metformin.     History purpose only  Previously seen Dr. King for PCOS. Before Dr. King, was under the care of [facility], was trying to have another child for the past 13 years, was found to have precancerous uterine polyps, taking progesterone. May possibly need a hysterectomy.   Thyroid US (Feb 2023) showing two nodules 5 x 5 x 5 mm left thyroid gland, no cystic component, no calcifications, larger nodule 8 x 4 x 5 mm, again no cyctic component, no calcifiactions. TSH 2.03 Was taking Levothyroxine 50mcg daily, 7 pills a week, while seeing an infertility specialist, no longer taking.       Current Outpatient Medications:     amLODIPine (Norvasc) 5 mg tablet, Take 1 tablet (5 mg) by mouth once daily., Disp: 90 tablet, Rfl: 3    cyanocobalamin, vitamin B-12, (VITAMIN B-12 ORAL), B12, Disp: , Rfl:     medroxyPROGESTERone (Provera) 10 mg tablet, Take 1 tablet (10 mg) by mouth once daily., Disp: 30 tablet, Rfl: 11    metFORMIN  mg 24 hr tablet, TAKE 1 TABLET WITH EVENING MEAL ORALLY FOUR TIMES A DAY FOR 90 DAYS, Disp: 360 tablet, Rfl: 3    multivit-min/ferrous fumarate (MULTI VITAMIN ORAL), 0 Refill(s), Type: Maintenance, Disp: , Rfl:     semaglutide (Ozempic) 1 mg/dose (4 mg/3 mL) pen injector, Inject 1 mg under the skin 1 (one) time per week., Disp: 9 mL, Rfl: 3      Allergies as of 08/19/2024 - Reviewed 08/19/2024   Allergen Reaction Noted    Azithromycin Insomnia, Other, and Unknown 11/03/2023    Potassium sulfate Unknown 11/03/2023         Review of Systems   Cardiology: Lightheadedness-denies.  Chest pain-denies.  Leg edema-denies.  Palpitations-denies.  Respiratory:  "Cough-denies. Shortness of breath-denies.  Wheezing-denies.  Gastroenterology: Constipation-denies.  Diarrhea-denies.  Heartburn-denies.  Endocrinology: Cold intolerance-denies.  Heat intolerance-denies.  Sweats-denies.  Neurology: Headache-denies.  Tremor-denies.  Neuropathy in extremities-denies.  Psychology: Low energy-denies.  Irritability-denies.  Sleep disturbances-denies.      /70   Pulse 82   Ht 1.753 m (5' 9\")   Wt (!) 164 kg (361 lb) Comment: Per patient  BMI 53.31 kg/m²       Labs:  Lab Results   Component Value Date    WBC 8.9 02/16/2024    NRBC 0.0 02/16/2024    RBC 4.88 02/16/2024    HGB 13.0 02/16/2024    HCT 41.1 02/16/2024     02/16/2024     Lab Results   Component Value Date    CALCIUM 9.1 02/16/2024    AST 13 02/16/2024    ALKPHOS 58 02/16/2024    BILITOT 0.9 02/16/2024    PROT 7.1 02/16/2024    ALBUMIN 4.0 02/16/2024    GLOB 3.4 12/17/2022    AGR 1.2 (L) 12/17/2022     02/16/2024    K 4.4 02/16/2024     02/16/2024    CO2 25 02/16/2024    ANIONGAP 11 02/16/2024    BUN 12 02/16/2024    CREATININE 0.63 02/16/2024    UREACREAUR 16.7 12/17/2022    GLUCOSE 85 02/16/2024    ALT 14 02/16/2024    EGFR >90 02/16/2024     Lab Results   Component Value Date    CHOL 152 02/16/2024    TRIG 91 02/16/2024    HDL 35.4 02/16/2024    LDLCALC 98 02/16/2024     Lab Results   Component Value Date    TSH 2.70 02/16/2024     Lab Results   Component Value Date    OVWNMKLU31 371 02/16/2024     Lab Results   Component Value Date    HGBA1C 5.3 02/16/2024         Physical Exam   General Appearance: pleasant, cooperative, no acute distress  HEENT: no chemosis, no proptosis, no lid lag, no lid retraction  Neck: no lymphadenopathy, no thyromegaly, no dominant thyroid nodules  Heart: no murmurs, regular rate and rhythm, S1 and S2  Lungs: no wheezes, no rhonci, no rales  Extremities: no lower extremity swelling      Assessment/Plan   1. Insulin resistance  -continue with GLP1 therapy  -can consider " increase dose to 2 mg slowly to prevent undesired side effects and maximize weight loss  -continues with Metformin 1.5 mg daily     2. PCOS (polycystic ovarian syndrome)  -maintains Metformin    3. Obesity, Class III, BMI 40-49.9 (morbid obesity) (Multi)  -continue with GLP1 treatment and plan to maximize dose      4. Thyroid Nodule  -no concerning changes from most recent US  -can plan to repeat in 3 yeas or sooner if develops obstructive symptoms    Follow Up: 6 months Dr. Russo    -labs/tests/notes reviewed  -reviewed and counseled patient on medication monitoring and side effects+

## 2024-11-11 NOTE — PROGRESS NOTES
"Patient ID: Karlee Beck is a 39 y.o. female.  Referring Physician: No referring provider defined for this encounter.  Primary Care Provider: Divya Kent MD    Subjective    Interval History:  She is going to the bathroom normally and eating and drinking normally, notes vulvar pruritus, irregular menses with dark blood and moodiness. The patient is considering having a hysterectomy, she does not desire future fertility, we discussed risks of the procedure and long term side effects.    She denies fever, chills, chest pain, SOB, nausea, vomiting, diarrhea, constipation, dysuria, or any other concerning signs of symptoms.      Tumor History:  - 2020: CAH bordering on G1 EAC, on Megace  - 2021: EMB with progesterone effect   - 2021: EMB with progesterone effect  - 2021: EMB with hyperplasia without atypia, MARIA EUGENIA 3 noted underwent subsequent pap and ECC which were wnl  - 2022: EMB with secretory endometrium   - 10/22: Focal endometrial hyperplasia  - : EMB benign  -  EMB benign  - 2024 EMB benign     Past medical history:  PCO S or \"prediabetes\".  Hypothyroid, treated medically  GERD  Morbid obesity        Prior surgery  Cholecystectomy  [place] teeth removal   section     OB/GYN history:   4 para 1031  Possible polycystic ovary syndrome.  Desires fertility preservation.  Would like to have another child.  Endometrial polyp suspected on saline sonohysterogram  and later undergoing polypectomy showing background endometrium with hyperplasia without atypia in May 2020  Began Provera 10 mg per day in 2020 after being referred to Dr. Evette Fournier  Endometrial biopsy 2020 showing atypical endometrial hyperplasia bordering on grade 1 cancer.     Family history:  Paternal grandmother with brain cancer  Maternal aunt with breast cancer, possibly premenopausal    Objective    BSA: 2.85 meters squared  /86 (BP Location: Left arm, Patient Position: Sitting, BP Cuff " "Size: Large adult long)   Pulse 76   Temp 36 °C (96.8 °F)   Resp 18   Ht (S) 1.759 m (5' 9.25\")   Wt (!) 166 kg (365 lb)   SpO2 98%   BMI 53.51 kg/m²      Physical Exam  Constitutional:       Appearance: Normal appearance. She is normal weight.   HENT:      Head: Normocephalic.      Mouth/Throat:      Mouth: Mucous membranes are moist.   Eyes:      Pupils: Pupils are equal, round, and reactive to light.   Cardiovascular:      Rate and Rhythm: Normal rate and regular rhythm.      Heart sounds: No murmur heard.     No friction rub. No gallop.   Pulmonary:      Effort: Pulmonary effort is normal.      Breath sounds: Normal breath sounds.   Abdominal:      General: Abdomen is flat. Bowel sounds are normal.      Palpations: Abdomen is soft.   Genitourinary:     Comments: Normal external genitalia without lesions or masses  Speculum Exam: Notable for a small amount of old bloody discharge noted in the vagina and on the cervix, cervix is normal in appearance, vagina is normal in appearance.  Bimanual: Smooth vagina and cervix with no lesions or masses.     Musculoskeletal:         General: No swelling.   Skin:     General: Skin is warm and dry.   Neurological:      Mental Status: She is alert.         Performance Status:  Asymptomatic    Assessment/Plan   39 y.o. 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 Last EMB benign     # Atypical hyperplasia  - Discussed plan for provera 10mg daily  - EMB today, will call patient with results   - F/u visit in 6 months, EMB at next visit   - She is deciding about possible hysterectomy      # Screening  - Due for a PAP test in 12/2026  - Mammogram needs to schedule      Scribe Attestation  By signing my name below, I, Brandee Jean, Scribe   attest that this documentation has been prepared under the direction and in the presence of Demetria Arreguin MD.   "

## 2024-11-12 ENCOUNTER — OFFICE VISIT (OUTPATIENT)
Dept: GYNECOLOGIC ONCOLOGY | Facility: CLINIC | Age: 39
End: 2024-11-12
Payer: COMMERCIAL

## 2024-11-12 VITALS
BODY MASS INDEX: 43.4 KG/M2 | TEMPERATURE: 96.8 F | OXYGEN SATURATION: 98 % | WEIGHT: 293 LBS | SYSTOLIC BLOOD PRESSURE: 144 MMHG | HEART RATE: 76 BPM | RESPIRATION RATE: 18 BRPM | DIASTOLIC BLOOD PRESSURE: 86 MMHG | HEIGHT: 69 IN

## 2024-11-12 DIAGNOSIS — Z71.89 ENCOUNTER FOR SURGICAL COUNSELING: ICD-10-CM

## 2024-11-12 DIAGNOSIS — N85.00 ENDOMETRIAL HYPERPLASIA: Primary | ICD-10-CM

## 2024-11-12 PROCEDURE — 99214 OFFICE O/P EST MOD 30 MIN: CPT | Performed by: STUDENT IN AN ORGANIZED HEALTH CARE EDUCATION/TRAINING PROGRAM

## 2024-11-12 PROCEDURE — 3077F SYST BP >= 140 MM HG: CPT | Performed by: STUDENT IN AN ORGANIZED HEALTH CARE EDUCATION/TRAINING PROGRAM

## 2024-11-12 PROCEDURE — 3008F BODY MASS INDEX DOCD: CPT | Performed by: STUDENT IN AN ORGANIZED HEALTH CARE EDUCATION/TRAINING PROGRAM

## 2024-11-12 PROCEDURE — 3079F DIAST BP 80-89 MM HG: CPT | Performed by: STUDENT IN AN ORGANIZED HEALTH CARE EDUCATION/TRAINING PROGRAM

## 2024-11-12 PROCEDURE — 1036F TOBACCO NON-USER: CPT | Performed by: STUDENT IN AN ORGANIZED HEALTH CARE EDUCATION/TRAINING PROGRAM

## 2024-11-12 ASSESSMENT — PAIN SCALES - GENERAL: PAINLEVEL_OUTOF10: 0-NO PAIN

## 2024-11-19 DIAGNOSIS — Z86.32 HX GESTATIONAL DIABETES: ICD-10-CM

## 2024-11-19 DIAGNOSIS — R73.01 IMPAIRED FASTING GLUCOSE: ICD-10-CM

## 2024-11-19 DIAGNOSIS — E66.01 OBESITY, CLASS III, BMI 40-49.9 (MORBID OBESITY) (MULTI): ICD-10-CM

## 2024-11-19 RX ORDER — SEMAGLUTIDE 1.34 MG/ML
1 INJECTION, SOLUTION SUBCUTANEOUS
Qty: 9 ML | Refills: 3 | Status: SHIPPED | OUTPATIENT
Start: 2024-11-24 | End: 2025-11-24

## 2024-12-27 ENCOUNTER — HOSPITAL ENCOUNTER (OUTPATIENT)
Dept: RADIOLOGY | Facility: HOSPITAL | Age: 39
Discharge: HOME | End: 2024-12-27
Payer: COMMERCIAL

## 2024-12-27 VITALS — HEIGHT: 69 IN | WEIGHT: 293 LBS | BODY MASS INDEX: 43.4 KG/M2

## 2024-12-27 DIAGNOSIS — Z12.39 BREAST SCREENING: ICD-10-CM

## 2024-12-27 PROCEDURE — 77063 BREAST TOMOSYNTHESIS BI: CPT | Performed by: RADIOLOGY

## 2024-12-27 PROCEDURE — 77067 SCR MAMMO BI INCL CAD: CPT | Performed by: RADIOLOGY

## 2024-12-27 PROCEDURE — 77067 SCR MAMMO BI INCL CAD: CPT

## 2025-01-06 ENCOUNTER — TELEPHONE (OUTPATIENT)
Dept: PRIMARY CARE | Facility: CLINIC | Age: 40
End: 2025-01-06
Payer: COMMERCIAL

## 2025-01-06 DIAGNOSIS — E03.8 SUBCLINICAL HYPOTHYROIDISM: ICD-10-CM

## 2025-01-06 DIAGNOSIS — I10 PRIMARY HYPERTENSION: ICD-10-CM

## 2025-01-06 DIAGNOSIS — Z00.00 WELL ADULT EXAM: ICD-10-CM

## 2025-01-06 DIAGNOSIS — R73.01 IMPAIRED FASTING GLUCOSE: ICD-10-CM

## 2025-02-17 ENCOUNTER — APPOINTMENT (OUTPATIENT)
Dept: ENDOCRINOLOGY | Facility: CLINIC | Age: 40
End: 2025-02-17
Payer: COMMERCIAL

## 2025-02-17 VITALS
HEART RATE: 82 BPM | BODY MASS INDEX: 44.41 KG/M2 | DIASTOLIC BLOOD PRESSURE: 85 MMHG | WEIGHT: 293 LBS | HEIGHT: 68 IN | SYSTOLIC BLOOD PRESSURE: 123 MMHG

## 2025-02-17 DIAGNOSIS — E28.2 POLYCYSTIC OVARIAN SYNDROME: ICD-10-CM

## 2025-02-17 DIAGNOSIS — Z86.32 HX GESTATIONAL DIABETES: ICD-10-CM

## 2025-02-17 DIAGNOSIS — R73.01 IMPAIRED FASTING GLUCOSE: ICD-10-CM

## 2025-02-17 DIAGNOSIS — E28.2 PCOS (POLYCYSTIC OVARIAN SYNDROME): Primary | ICD-10-CM

## 2025-02-17 DIAGNOSIS — E04.1 THYROID NODULE: ICD-10-CM

## 2025-02-17 PROCEDURE — 3008F BODY MASS INDEX DOCD: CPT | Performed by: INTERNAL MEDICINE

## 2025-02-17 PROCEDURE — 99214 OFFICE O/P EST MOD 30 MIN: CPT | Performed by: INTERNAL MEDICINE

## 2025-02-17 PROCEDURE — 1036F TOBACCO NON-USER: CPT | Performed by: INTERNAL MEDICINE

## 2025-02-17 PROCEDURE — 3074F SYST BP LT 130 MM HG: CPT | Performed by: INTERNAL MEDICINE

## 2025-02-17 PROCEDURE — 3079F DIAST BP 80-89 MM HG: CPT | Performed by: INTERNAL MEDICINE

## 2025-02-17 RX ORDER — METFORMIN HYDROCHLORIDE 500 MG/1
TABLET, EXTENDED RELEASE ORAL
Qty: 270 TABLET | Refills: 3 | Status: SHIPPED | OUTPATIENT
Start: 2025-02-17

## 2025-02-17 ASSESSMENT — ENCOUNTER SYMPTOMS
DEPRESSION: 0
LOSS OF SENSATION IN FEET: 0
OCCASIONAL FEELINGS OF UNSTEADINESS: 0

## 2025-02-17 ASSESSMENT — PAIN SCALES - GENERAL: PAINLEVEL_OUTOF10: 0-NO PAIN

## 2025-02-17 NOTE — PROGRESS NOTES
HPI    39 yo  of Dr. King practice with Insulin Resistance, obesity, PCOS, gestational diabetes, HTN, Multinodular Goiter in 6 month follow up.    Thyroid:  Euthyroid, no compressive/obstructive neck complaints.   Thyroid US (Feb 2023) showing two nodules 5 x 5 x 5 mm left thyroid gland, no cystic component, no calcifications, larger nodule 8 x 4 x 5 mm, again no cyctic component, no calcifications.    Was taking Levothyroxine 50mcg daily, 7 pills a week, while seeing an infertility specialist, no longer taking.       Pcos/wt:  Currently taking Ozempic 1 mg weekly, start weight 390 pounds current 366 pounds, sometimes has GI side effects, (constipation/bloating/belching-often diet related every other week), and Metformin.        Current Outpatient Medications:     amLODIPine (Norvasc) 5 mg tablet, Take 1 tablet (5 mg) by mouth once daily., Disp: 90 tablet, Rfl: 3    medroxyPROGESTERone (Provera) 10 mg tablet, Take 1 tablet (10 mg) by mouth once daily., Disp: 30 tablet, Rfl: 11    metFORMIN  mg 24 hr tablet, TAKE 1 TABLET WITH EVENING MEAL ORALLY FOUR TIMES A DAY FOR 90 DAYS, Disp: 360 tablet, Rfl: 3    multivit-min/ferrous fumarate (MULTI VITAMIN ORAL), 0 Refill(s), Type: Maintenance, Disp: , Rfl:     semaglutide (Ozempic) 1 mg/dose (4 mg/3 mL) pen injector, Inject 1 mg under the skin 1 (one) time per week., Disp: 9 mL, Rfl: 3      Allergies as of 02/17/2025 - Reviewed 02/17/2025   Allergen Reaction Noted    Azithromycin Insomnia, Other, and Unknown 11/03/2023    Potassium sulfate Unknown 11/03/2023         Review of Systems   Cardiology: Lightheadedness-denies.  Chest pain-denies.  Leg edema-denies.  Palpitations-denies.  Respiratory: Cough-denies. Shortness of breath-denies.  Wheezing-denies.  Gastroenterology: Constipation-denies.  Diarrhea-denies.  Heartburn-denies.  Endocrinology: Cold intolerance-denies.  Heat intolerance-denies.  Sweats-denies.  Neurology: Headache-denies.  Tremor-denies.  Neuropathy  "in extremities-denies.  Psychology: Low energy-denies.  Irritability-denies.  Sleep disturbances-denies.      /85 (BP Location: Right arm, Patient Position: Sitting)   Pulse 82   Ht 1.727 m (5' 8\")   Wt (!) 166 kg (366 lb)   BMI 55.65 kg/m²       Labs:  Lab Results   Component Value Date    WBC 8.9 02/16/2024    NRBC 0.0 02/16/2024    RBC 4.88 02/16/2024    HGB 13.0 02/16/2024    HCT 41.1 02/16/2024     02/16/2024     Lab Results   Component Value Date    CALCIUM 9.1 02/16/2024    AST 13 02/16/2024    ALKPHOS 58 02/16/2024    BILITOT 0.9 02/16/2024    PROT 7.1 02/16/2024    ALBUMIN 4.0 02/16/2024    GLOB 3.4 12/17/2022    AGR 1.2 (L) 12/17/2022     02/16/2024    K 4.4 02/16/2024     02/16/2024    CO2 25 02/16/2024    ANIONGAP 11 02/16/2024    BUN 12 02/16/2024    CREATININE 0.63 02/16/2024    UREACREAUR 16.7 12/17/2022    GLUCOSE 85 02/16/2024    ALT 14 02/16/2024    EGFR >90 02/16/2024     Lab Results   Component Value Date    CHOL 152 02/16/2024    TRIG 91 02/16/2024    HDL 35.4 02/16/2024    LDLCALC 98 02/16/2024     No results found for: \"MICROALBCREA\"  Lab Results   Component Value Date    TSH 2.70 02/16/2024     Lab Results   Component Value Date    CIERHMFB19 371 02/16/2024     Lab Results   Component Value Date    HGBA1C 5.3 02/16/2024         Physical Exam   General Appearance: pleasant, cooperative, no acute distress  HEENT: no chemosis, no proptosis, no lid lag, no lid retraction  Neck: no lymphadenopathy, no thyromegaly, no dominant thyroid nodules  Heart: no murmurs, regular rate and rhythm, S1 and S2  Lungs: no wheezes, no rhonci, no rales  Extremities: no lower extremity swelling      Assessment/Plan   1. PCOS (polycystic ovarian syndrome) (Primary)  -metformin/ozempic lifestyle  -check A1c    -discussed increasing ozempic  -discussed bariatric surgery, pt very nervous about, would consider going to a informational session    2. Thyroid nodule  -will follow, repeat " tsh    3. Hx gestational diabetes  -lifestyle, meds/screen with A1c/fasting sugars yearly    4. Impaired fasting glucose  -as above         Follow Up:  CM Moffett 6 months then consider yearly thereafter    -labs/tests/notes reviewed  -reviewed and counseled patient on medication monitoring and side effects

## 2025-02-17 NOTE — PROGRESS NOTES
"Patient ID: Karlee Beck is a 40 y.o. female.  Referring Physician: No referring provider defined for this encounter.  Primary Care Provider: IRENA Michel    Subjective    Interval History:  Karlee expressed she is ready to pursue a hysterectomy. States she takes Ozempic and asked about the contraindication with surgery. Overall she has regular bowel habits and drinks plenty of water. We discussed surgery, recovery and surgical menopause in great detail.    She denies fever, chills, chest pain, SOB, nausea, vomiting, diarrhea, constipation, dysuria, or any other concerning signs of symptoms.      Tumor History:  - 2020: CAH bordering on G1 EAC, on Megace  - 2021: EMB with progesterone effect   - 2021: EMB with progesterone effect  - 2021: EMB with hyperplasia without atypia, MARIA EUGENIA 3 noted underwent subsequent pap and ECC which were wnl  - 2022: EMB with secretory endometrium   - 10/22: Focal endometrial hyperplasia  - : EMB benign  -  EMB benign  - 2024 EMB benign     Past medical history:  PCO S or \"prediabetes\".  Hypothyroid, treated medically  GERD  Morbid obesity        Prior surgery  Cholecystectomy  [place] teeth removal   section     OB/GYN history:   4 para 1031  Possible polycystic ovary syndrome.  Desires fertility preservation.  Would like to have another child.  Endometrial polyp suspected on saline sonohysterogram  and later undergoing polypectomy showing background endometrium with hyperplasia without atypia in May 2020  Began Provera 10 mg per day in 2020 after being referred to Dr. Evette Fournier  Endometrial biopsy 2020 showing atypical endometrial hyperplasia bordering on grade 1 cancer.     Family history:  Paternal grandmother with brain cancer  Maternal aunt with breast cancer, possibly premenopausal    Objective    BSA: 2.85 meters squared  /86 (BP Location: Right arm, Patient Position: Sitting, BP Cuff Size: Large adult)   " Pulse 90   Temp 36.2 °C (97.2 °F) (Temporal)   Resp 16   Wt (!) 169 kg (372 lb 11 oz)   SpO2 97%   BMI 56.67 kg/m²      Physical Exam  Constitutional:       Appearance: Normal appearance. She is normal weight.   HENT:      Head: Normocephalic.      Mouth/Throat:      Mouth: Mucous membranes are moist.   Eyes:      Pupils: Pupils are equal, round, and reactive to light.   Cardiovascular:      Rate and Rhythm: Normal rate and regular rhythm.      Heart sounds: No murmur heard.     No friction rub. No gallop.   Pulmonary:      Effort: Pulmonary effort is normal.      Breath sounds: Normal breath sounds.   Abdominal:      General: Abdomen is flat. Bowel sounds are normal.      Palpations: Abdomen is soft.   Musculoskeletal:         General: No swelling.   Skin:     General: Skin is warm and dry.   Neurological:      Mental Status: She is alert.         Performance Status:  Asymptomatic    Assessment/Plan   40 y.o. 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 Last EMB benign     # Atypical hyperplasia  - Discussed plan for provera 10mg daily  - She is interested in definitive therapy with hysterectomy/BS  - We discussed plan for robotic hysterectomy, bilateral salpingectomy, any other indicated procedures  - Discussed risks/benefits/alternatives of surgery including but not limited to: 1% risk of surgical complication, bleeding, infection, DVT, pneumonia, re-operation. Patient voiced understanding and all questions were answered.  - We reviewed risks, anticipated hospital stay, and expected recovery  - She will need PAT  - She is a candidate for same day DC   - Would plan for surgery in June      # Screening  - Due for a PAP test in 12/2026  - Mammogram BIRADS 1 12/2024      Scribe Attestation  By signing my name below, I, Brandee Jean, Eder   attest that this documentation has been prepared under the direction and  in the presence of Demetria Arreguin MD.     Provider Attestation - Scribe documentation    All medical record entries made by the Scribe were at my direction and personally dictated by me. I have reviewed the chart and agree that the record accurately reflects my personal performance of the history, physical exam, discussion and plan.    Demetria Arreguin MD

## 2025-02-18 ENCOUNTER — OFFICE VISIT (OUTPATIENT)
Dept: GYNECOLOGIC ONCOLOGY | Facility: CLINIC | Age: 40
End: 2025-02-18
Payer: COMMERCIAL

## 2025-02-18 VITALS
WEIGHT: 293 LBS | HEART RATE: 90 BPM | DIASTOLIC BLOOD PRESSURE: 86 MMHG | OXYGEN SATURATION: 97 % | SYSTOLIC BLOOD PRESSURE: 141 MMHG | TEMPERATURE: 97.2 F | RESPIRATION RATE: 16 BRPM | BODY MASS INDEX: 56.67 KG/M2

## 2025-02-18 DIAGNOSIS — N85.00 ENDOMETRIAL HYPERPLASIA: Primary | ICD-10-CM

## 2025-02-18 DIAGNOSIS — E66.01 MORBID (SEVERE) OBESITY DUE TO EXCESS CALORIES (MULTI): ICD-10-CM

## 2025-02-18 DIAGNOSIS — Z71.89 ENCOUNTER FOR SURGICAL COUNSELING: ICD-10-CM

## 2025-02-18 PROCEDURE — 99214 OFFICE O/P EST MOD 30 MIN: CPT | Performed by: STUDENT IN AN ORGANIZED HEALTH CARE EDUCATION/TRAINING PROGRAM

## 2025-02-18 PROCEDURE — 1036F TOBACCO NON-USER: CPT | Performed by: STUDENT IN AN ORGANIZED HEALTH CARE EDUCATION/TRAINING PROGRAM

## 2025-02-18 PROCEDURE — 3077F SYST BP >= 140 MM HG: CPT | Performed by: STUDENT IN AN ORGANIZED HEALTH CARE EDUCATION/TRAINING PROGRAM

## 2025-02-18 PROCEDURE — 3079F DIAST BP 80-89 MM HG: CPT | Performed by: STUDENT IN AN ORGANIZED HEALTH CARE EDUCATION/TRAINING PROGRAM

## 2025-02-18 ASSESSMENT — PAIN SCALES - GENERAL: PAINLEVEL_OUTOF10: 0-NO PAIN

## 2025-02-25 ENCOUNTER — TELEPHONE (OUTPATIENT)
Dept: GYNECOLOGIC ONCOLOGY | Facility: HOSPITAL | Age: 40
End: 2025-02-25
Payer: COMMERCIAL

## 2025-02-25 NOTE — TELEPHONE ENCOUNTER
The patient's FMLA forms were faxed to her employer at . Patient notified via [employer] message.

## 2025-03-05 LAB
ALBUMIN SERPL-MCNC: 3.9 G/DL (ref 3.6–5.1)
ALBUMIN SERPL-MCNC: 4 G/DL (ref 3.6–5.1)
ALBUMIN/CREAT UR: 50 MG/G CREAT
ALP SERPL-CCNC: 52 U/L (ref 31–125)
ALP SERPL-CCNC: 53 U/L (ref 31–125)
ALT SERPL-CCNC: 14 U/L (ref 6–29)
ALT SERPL-CCNC: 15 U/L (ref 6–29)
ANION GAP SERPL CALCULATED.4IONS-SCNC: 7 MMOL/L (CALC) (ref 7–17)
ANION GAP SERPL CALCULATED.4IONS-SCNC: 9 MMOL/L (CALC) (ref 7–17)
APPEARANCE UR: CLEAR
AST SERPL-CCNC: 12 U/L (ref 10–30)
AST SERPL-CCNC: 14 U/L (ref 10–30)
BACTERIA #/AREA URNS HPF: ABNORMAL /HPF
BASOPHILS # BLD AUTO: 74 CELLS/UL (ref 0–200)
BASOPHILS NFR BLD AUTO: 0.8 %
BILIRUB SERPL-MCNC: 0.7 MG/DL (ref 0.2–1.2)
BILIRUB SERPL-MCNC: 0.7 MG/DL (ref 0.2–1.2)
BILIRUB UR QL STRIP: NEGATIVE
BUN SERPL-MCNC: 9 MG/DL (ref 7–25)
BUN SERPL-MCNC: 9 MG/DL (ref 7–25)
CALCIUM SERPL-MCNC: 9 MG/DL (ref 8.6–10.2)
CALCIUM SERPL-MCNC: 9.1 MG/DL (ref 8.6–10.2)
CHLORIDE SERPL-SCNC: 106 MMOL/L (ref 98–110)
CHLORIDE SERPL-SCNC: 107 MMOL/L (ref 98–110)
CHOLEST SERPL-MCNC: 169 MG/DL
CHOLEST SERPL-MCNC: 171 MG/DL
CHOLEST/HDLC SERPL: 3.7 (CALC)
CHOLEST/HDLC SERPL: 3.8 (CALC)
CO2 SERPL-SCNC: 23 MMOL/L (ref 20–32)
CO2 SERPL-SCNC: 23 MMOL/L (ref 20–32)
COLOR UR: YELLOW
CREAT SERPL-MCNC: 0.56 MG/DL (ref 0.5–0.99)
CREAT SERPL-MCNC: 0.57 MG/DL (ref 0.5–0.99)
CREAT UR-MCNC: 178 MG/DL (ref 20–275)
EGFRCR SERPLBLD CKD-EPI 2021: 117 ML/MIN/1.73M2
EGFRCR SERPLBLD CKD-EPI 2021: 118 ML/MIN/1.73M2
EOSINOPHIL # BLD AUTO: 716 CELLS/UL (ref 15–500)
EOSINOPHIL NFR BLD AUTO: 7.7 %
ERYTHROCYTE [DISTWIDTH] IN BLOOD BY AUTOMATED COUNT: 13 % (ref 11–15)
EST. AVERAGE GLUCOSE BLD GHB EST-MCNC: 111 MG/DL
EST. AVERAGE GLUCOSE BLD GHB EST-MCNC: 114 MG/DL
EST. AVERAGE GLUCOSE BLD GHB EST-SCNC: 6.2 MMOL/L
EST. AVERAGE GLUCOSE BLD GHB EST-SCNC: 6.3 MMOL/L
GLUCOSE SERPL-MCNC: 94 MG/DL (ref 65–99)
GLUCOSE SERPL-MCNC: 95 MG/DL (ref 65–99)
GLUCOSE UR QL STRIP: NEGATIVE
HBA1C MFR BLD: 5.5 % OF TOTAL HGB
HBA1C MFR BLD: 5.6 % OF TOTAL HGB
HCT VFR BLD AUTO: 39.1 % (ref 35–45)
HDLC SERPL-MCNC: 45 MG/DL
HDLC SERPL-MCNC: 46 MG/DL
HGB BLD-MCNC: 12.4 G/DL (ref 11.7–15.5)
HGB UR QL STRIP: NEGATIVE
HYALINE CASTS #/AREA URNS LPF: ABNORMAL /LPF
KETONES UR QL STRIP: NEGATIVE
LDLC SERPL CALC-MCNC: 107 MG/DL (CALC)
LDLC SERPL CALC-MCNC: 110 MG/DL (CALC)
LEUKOCYTE ESTERASE UR QL STRIP: NEGATIVE
LYMPHOCYTES # BLD AUTO: 1925 CELLS/UL (ref 850–3900)
LYMPHOCYTES NFR BLD AUTO: 20.7 %
MCH RBC QN AUTO: 27.6 PG (ref 27–33)
MCHC RBC AUTO-ENTMCNC: 31.7 G/DL (ref 32–36)
MCV RBC AUTO: 87.1 FL (ref 80–100)
MICROALBUMIN UR-MCNC: 8.9 MG/DL
MONOCYTES # BLD AUTO: 474 CELLS/UL (ref 200–950)
MONOCYTES NFR BLD AUTO: 5.1 %
NEUTROPHILS # BLD AUTO: 6110 CELLS/UL (ref 1500–7800)
NEUTROPHILS NFR BLD AUTO: 65.7 %
NITRITE UR QL STRIP: NEGATIVE
NONHDLC SERPL-MCNC: 123 MG/DL (CALC)
NONHDLC SERPL-MCNC: 126 MG/DL (CALC)
PH UR STRIP: 5.5 [PH] (ref 5–8)
PLATELET # BLD AUTO: 351 THOUSAND/UL (ref 140–400)
PMV BLD REES-ECKER: 11.5 FL (ref 7.5–12.5)
POTASSIUM SERPL-SCNC: 4.7 MMOL/L (ref 3.5–5.3)
POTASSIUM SERPL-SCNC: 4.7 MMOL/L (ref 3.5–5.3)
PROT SERPL-MCNC: 6.7 G/DL (ref 6.1–8.1)
PROT SERPL-MCNC: 6.9 G/DL (ref 6.1–8.1)
PROT UR QL STRIP: ABNORMAL
RBC # BLD AUTO: 4.49 MILLION/UL (ref 3.8–5.1)
RBC #/AREA URNS HPF: ABNORMAL /HPF
SERVICE CMNT-IMP: ABNORMAL
SODIUM SERPL-SCNC: 136 MMOL/L (ref 135–146)
SODIUM SERPL-SCNC: 139 MMOL/L (ref 135–146)
SP GR UR STRIP: 1.02 (ref 1–1.03)
SQUAMOUS #/AREA URNS HPF: ABNORMAL /HPF
TRIGL SERPL-MCNC: 70 MG/DL
TRIGL SERPL-MCNC: 70 MG/DL
TSH SERPL-ACNC: 3.48 MIU/L
TSH SERPL-ACNC: 3.51 MIU/L
VIT B12 SERPL-MCNC: 329 PG/ML (ref 200–1100)
WBC # BLD AUTO: 9.3 THOUSAND/UL (ref 3.8–10.8)
WBC #/AREA URNS HPF: ABNORMAL /HPF

## 2025-03-10 ENCOUNTER — APPOINTMENT (OUTPATIENT)
Dept: PRIMARY CARE | Facility: CLINIC | Age: 40
End: 2025-03-10
Payer: COMMERCIAL

## 2025-03-10 VITALS
OXYGEN SATURATION: 96 % | SYSTOLIC BLOOD PRESSURE: 134 MMHG | TEMPERATURE: 98.5 F | BODY MASS INDEX: 41.95 KG/M2 | DIASTOLIC BLOOD PRESSURE: 84 MMHG | HEART RATE: 91 BPM | WEIGHT: 293 LBS | HEIGHT: 70 IN

## 2025-03-10 DIAGNOSIS — Z00.00 ANNUAL PHYSICAL EXAM: Primary | ICD-10-CM

## 2025-03-10 DIAGNOSIS — I10 PRIMARY HYPERTENSION: ICD-10-CM

## 2025-03-10 DIAGNOSIS — L72.3 SEBACEOUS CYST: ICD-10-CM

## 2025-03-10 PROBLEM — N39.0 URINARY TRACT INFECTION: Status: ACTIVE | Noted: 2025-03-10

## 2025-03-10 PROBLEM — O09.529 MULTIGRAVIDA OF ADVANCED MATERNAL AGE (HHS-HCC): Status: ACTIVE | Noted: 2025-03-10

## 2025-03-10 PROBLEM — K92.1 MELENA: Status: ACTIVE | Noted: 2025-03-10

## 2025-03-10 PROBLEM — R03.0 FINDING OF ABOVE NORMAL BLOOD PRESSURE: Status: ACTIVE | Noted: 2025-03-10

## 2025-03-10 PROBLEM — M77.8 TENDINITIS OF WRIST: Status: ACTIVE | Noted: 2023-05-03

## 2025-03-10 PROCEDURE — G8433 SCR FOR DEP NOT CPT DOC RSN: HCPCS

## 2025-03-10 PROCEDURE — 3008F BODY MASS INDEX DOCD: CPT

## 2025-03-10 PROCEDURE — 3075F SYST BP GE 130 - 139MM HG: CPT

## 2025-03-10 PROCEDURE — 99213 OFFICE O/P EST LOW 20 MIN: CPT

## 2025-03-10 PROCEDURE — 99396 PREV VISIT EST AGE 40-64: CPT

## 2025-03-10 PROCEDURE — 3079F DIAST BP 80-89 MM HG: CPT

## 2025-03-10 PROCEDURE — 1036F TOBACCO NON-USER: CPT

## 2025-03-10 RX ORDER — AMLODIPINE BESYLATE 5 MG/1
5 TABLET ORAL DAILY
Qty: 90 TABLET | Refills: 1 | Status: SHIPPED | OUTPATIENT
Start: 2025-03-10

## 2025-03-10 ASSESSMENT — COLUMBIA-SUICIDE SEVERITY RATING SCALE - C-SSRS
6. HAVE YOU EVER DONE ANYTHING, STARTED TO DO ANYTHING, OR PREPARED TO DO ANYTHING TO END YOUR LIFE?: NO
1. IN THE PAST MONTH, HAVE YOU WISHED YOU WERE DEAD OR WISHED YOU COULD GO TO SLEEP AND NOT WAKE UP?: NO
2. HAVE YOU ACTUALLY HAD ANY THOUGHTS OF KILLING YOURSELF?: NO

## 2025-03-10 ASSESSMENT — PROMIS GLOBAL HEALTH SCALE
RATE_AVERAGE_PAIN: 0
CARRYOUT_PHYSICAL_ACTIVITIES: COMPLETELY
CARRYOUT_SOCIAL_ACTIVITIES: VERY GOOD
RATE_AVERAGE_FATIGUE: MILD
RATE_GENERAL_HEALTH: GOOD
RATE_SOCIAL_SATISFACTION: GOOD
RATE_PHYSICAL_HEALTH: GOOD
RATE_QUALITY_OF_LIFE: GOOD
RATE_MENTAL_HEALTH: FAIR
EMOTIONAL_PROBLEMS: SOMETIMES

## 2025-03-10 ASSESSMENT — PATIENT HEALTH QUESTIONNAIRE - PHQ9
1. LITTLE INTEREST OR PLEASURE IN DOING THINGS: NOT AT ALL
SUM OF ALL RESPONSES TO PHQ9 QUESTIONS 1 AND 2: 0
2. FEELING DOWN, DEPRESSED OR HOPELESS: NOT AT ALL

## 2025-03-10 ASSESSMENT — VISUAL ACUITY: OU: 1

## 2025-03-10 NOTE — PROGRESS NOTES
Subjective   Patient ID: Karlee Beck is a 40 y.o. female who presents for Annual Exam (Head concern possible cyst 1month ago ).    HPI     Karlee Beck presents for annual physical exam.  Karlee is a new patient to this provider, previously patient of [facility].       Patient's recent visit notes, medication and allergy lists, past medical surgical social hx, immunization, vitals, problem list, recent tests were reviewed by me for pertinence to this visit.      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  Smoking: No  Alcohol: Yes -2 drinks per week  Recreational drug use: No      Screening tools:  EKG: Yes -2024-results reviewed    Colon cancer screening: Yes - due to blood in stools- thinks possibly Dr. Lopez    Mammogram: Yes - 12/2024    PAP: Yes -follows with gynecology scheduled for upcoming hysterectomy due to endometrial hyperplasia      Vaccinations:  Tdap: 2022  Flu Vaccine: UTD through work  COVID UTD      Review of Systems  GENERAL - Denies fever/chills, recent illness, unexplained weight loss  HEENT- Denies change in vision, double vision, blurred. Denies hearing changes, ear pain. Denies nose bleeds. Denies sore throat, difficulty swallowing.    RESP - Denies SOB or cough  CVS - Denies CP, palpitations  GI - Denies nausea or abdominal pain, hematochezia/melena  - Denies urinary frequency, urgency or incontinence.  Denies nocturia.   NEURO - Denies headache, dizziness  MSK - Denies joint, neck or back pain  Skin - Denies abnormal lesions, rash.  Palpable raised lump to left posterior head which seems to be growing in size.  PSYCH-Denies anxiety, depression, changes in mood      Objective   /84 (BP Location: Left arm, Patient Position: Sitting, BP Cuff Size: Adult)   Pulse 91   Temp 36.9 °C (98.5 °F) (Temporal)   Ht 1.765 m  "(5' 9.5\")   Wt (!) 167 kg (368 lb)   SpO2 96%   BMI 53.56 kg/m²     Physical Exam  Vitals and nursing note reviewed.   Constitutional:       General: She is not in acute distress.     Appearance: Normal appearance. She is well-developed and well-groomed.   HENT:      Head: Normocephalic.      Jaw: There is normal jaw occlusion.        Right Ear: Hearing, tympanic membrane, ear canal and external ear normal.      Left Ear: Hearing, tympanic membrane, ear canal and external ear normal.      Nose: Nose normal.      Mouth/Throat:      Lips: Pink.      Mouth: Mucous membranes are moist.      Pharynx: Oropharynx is clear. Uvula midline.   Eyes:      General: Lids are normal. Vision grossly intact. Gaze aligned appropriately.      Extraocular Movements: Extraocular movements intact.      Conjunctiva/sclera: Conjunctivae normal.      Pupils: Pupils are equal, round, and reactive to light.   Neck:      Thyroid: No thyromegaly or thyroid tenderness.      Vascular: No carotid bruit or JVD.      Trachea: Trachea and phonation normal.   Cardiovascular:      Rate and Rhythm: Normal rate and regular rhythm.      Pulses: Normal pulses.      Heart sounds: Normal heart sounds, S1 normal and S2 normal.   Pulmonary:      Effort: Pulmonary effort is normal.      Breath sounds: Normal breath sounds and air entry.   Abdominal:      General: Bowel sounds are normal. There is no distension.      Palpations: Abdomen is soft. There is no hepatomegaly, splenomegaly or mass.      Tenderness: There is no abdominal tenderness. There is no right CVA tenderness, left CVA tenderness, guarding or rebound.   Musculoskeletal:         General: Normal range of motion.      Cervical back: Full passive range of motion without pain, normal range of motion and neck supple.      Right lower leg: No edema.      Left lower leg: No edema.   Lymphadenopathy:      Cervical: No cervical adenopathy.   Skin:     General: Skin is warm and dry.      Capillary " Refill: Capillary refill takes less than 2 seconds.   Neurological:      General: No focal deficit present.      Mental Status: She is alert and oriented to person, place, and time.   Psychiatric:         Attention and Perception: Attention normal.         Mood and Affect: Mood and affect normal.         Speech: Speech normal.         Behavior: Behavior normal. Behavior is cooperative.           Assessment & Plan  Annual physical exam  Well adult exam.  1. Age appropriate preventative measures reviewed.   2. Encouraged healthy diet and exercise.  3. Immunizations- Reviewed, up-to-date  4. Labs-reviewed with patient at this visit  5. Medications- Reviewed    *Follow-up in 1 year for repeat annual physical exam. Patient verbalizes understanding  regarding plan of care and all questions answered.         Primary hypertension  Chronic condition, stable at this visit  Continue amlodipine 5 mg daily as prescribed.  Monitor home blood pressures.  Call if blood pressure consistently >140/90.  Non pharmacological interventions such as lowering salt, saturated fats, cholesterol, and sugar diet discussed.  Increase physical activity, aim for 30 minutes 5 days per week as able.  Stress reduction interventions discussed.  Discussed signs and symptoms of major cardiovascular event and need to present to the ED.   Reevaluate in 6 months.    Orders:    amLODIPine (Norvasc) 5 mg tablet; Take 1 tablet (5 mg) by mouth once daily.    Sebaceous cyst  Referral to dermatology for further evaluation and intervention.  Orders:    Referral to Dermatology

## 2025-03-10 NOTE — ASSESSMENT & PLAN NOTE
Chronic condition, stable at this visit  Continue amlodipine 5 mg daily as prescribed.  Monitor home blood pressures.  Call if blood pressure consistently >140/90.  Non pharmacological interventions such as lowering salt, saturated fats, cholesterol, and sugar diet discussed.  Increase physical activity, aim for 30 minutes 5 days per week as able.  Stress reduction interventions discussed.  Discussed signs and symptoms of major cardiovascular event and need to present to the ED.   Reevaluate in 6 months.    Orders:    amLODIPine (Norvasc) 5 mg tablet; Take 1 tablet (5 mg) by mouth once daily.

## 2025-05-13 ENCOUNTER — APPOINTMENT (OUTPATIENT)
Dept: GYNECOLOGIC ONCOLOGY | Facility: CLINIC | Age: 40
End: 2025-05-13
Payer: COMMERCIAL

## 2025-06-04 DIAGNOSIS — N85.00 ENDOMETRIAL HYPERPLASIA: Primary | ICD-10-CM

## 2025-06-09 ENCOUNTER — CLINICAL SUPPORT (OUTPATIENT)
Dept: PREADMISSION TESTING | Facility: HOSPITAL | Age: 40
End: 2025-06-09
Payer: COMMERCIAL

## 2025-06-09 NOTE — CPM/PAT H&P
CPM/PAT Evaluation       Name: Karlee Beck (Karlee Beck)  /Age: 1985/40 y.o.     { PAT Visit Type:64078}      Chief Complaint: ***    HPI    Medical History[1]    Surgical History[2]    Patient  reports being sexually active and has had partner(s) who are male. She reports using the following method of birth control/protection: None.    Family History[3]    Allergies[4]    Karlee Beck is scheduled for Total robotic hysterectomy, bilateral salpingectomy, any other indicated procedures on 25    **Data Input  Prior to Admission medications    Medication Sig Start Date End Date Taking? Authorizing Provider   amLODIPine (Norvasc) 5 mg tablet Take 1 tablet (5 mg) by mouth once daily. 3/10/25   LASHAY Michel-SHAGUFTA   medroxyPROGESTERone (Provera) 10 mg tablet Take 1 tablet (10 mg) by mouth once daily. 24  Amaya Kirk MD   metFORMIN  mg 24 hr tablet 3 pills daily, Do not crush, chew, or split. 25   Carlos Russo MD   multivit-min/ferrous fumarate (MULTI VITAMIN ORAL) 0 Refill(s), Type: Maintenance 18   Historical Provider, MD   semaglutide (Ozempic) 1 mg/dose (4 mg/3 mL) pen injector Inject 1 mg under the skin 1 (one) time per week. 24  Carlos Russo MD        [facility]     PAT Physical Exam     Airway  Visit Vitals  OB Status Having periods   Smoking Status Never       DASI Risk Score      Flowsheet Row Questionnaire Series Submission from 2025 in [facility] with Generic Provider Natasha   Can you take care of yourself (eat, dress, bathe, or use toilet)?  2.75  filed at 2025 0614   Can you walk indoors, such as around your house? 1.75  filed at 2025 0614   Can you walk a block or two on level ground?  2.75  filed at 2025 0614   Can you climb a flight of stairs or walk up a hill? 5.5  filed at 2025 0614   Can you run a short distance? 8  filed at 2025 0614   Can you do light work around the house like  dusting or washing dishes? 2.7  filed at 05/13/2025 0614   Can you do moderate work around the house like vacuuming, sweeping floors or carrying groceries? 3.5  filed at 05/13/2025 0614   Can you do heavy work around the house like scrubbing floors or lifting and moving heavy furniture?  8  filed at 05/13/2025 0614   Can you do yard work like raking leaves, weeding or pushing a mower? 4.5  filed at 05/13/2025 0614   Can you have sexual relations? 5.25  filed at 05/13/2025 0614   Can you participate in moderate recreational activities like golf, bowling, dancing, doubles tennis or throwing a baseball or football? 6  filed at 05/13/2025 0614          Caprini DVT Assessment    No data to display       Modified Frailty Index    No data to display       [id number] Stroke Risk Points  Current as of just now        N/A 0 to 9 Points:      Last Change: N/A          The [id number] risk score (Lip GH, et al. 2009. © 2010 American [employer]) quantifies the risk of stroke for a patient with atrial fibrillation. For patients without atrial fibrillation or under the age of 18 this score appears as N/A. Higher score values generally indicate higher risk of stroke.        This score is not applicable to this patient. Components are not calculated.          Revised Cardiac Risk Index    No data to display       Apfel Simplified Score    No data to display       Risk Analysis Index Results This Encounter    No data found in the last 10 encounters.       Stop Bang Score      Flowsheet Row Questionnaire Series Submission from 5/13/2025 in [facility] Care with Generic Provider Natasha   Do you snore loudly? 1  filed at 05/13/2025 0614   Do you often feel tired or fatigued after your sleep? 0  filed at 05/13/2025 0614   Has anyone ever observed you stop breathing in your sleep? 0  filed at 05/13/2025 0614   Do you have or are you being treated for high blood pressure? 1  filed at 05/13/2025 0614   Recent BMI  (Calculated) 53.6  filed at 2025   Is BMI greater than 35 kg/m2? 1=Yes  filed at 2025   Age older than 50 years old? 0=No  filed at 2025   Gender - Male 0=No  filed at 2025          Prodigy: High Risk  Total Score: 0          ARISCAT Score for Postoperative Pulmonary Complications    No data to display       [place] Perioperative Risk for Myocardial Infarction or Cardiac Arrest (JOSE R)    No data to display     --Testing/Diagnostic:        - EK24      - Echo: 01/10/19  Exam quality: technically difficult   There is normal left ventricular systolic function.   Estimated ejection fraction is >60%.   The left atrial size is mildly dilated.   No evidence of aortic regurgitation.   A trace of mitral regurgitation.   A trace of tricuspid regurgitation.      - US Thyroid: 24  IMPRESSION:  No significant change in left-sided thyroid nodules as follows:  -0.5 x 0.4 x 0.5 cm (previously measuring 0.8 x 0.4 x 0.5 cm) left  lateral thyroid nodule, solid, hypoechoic. TR 4. -0.5 x 0.5 x 0.6 cm  (previously 0.5 x 0.5 x 0.5 cm) left medial thyroid nodule, solid,  hypoechoic. TR 4.        Patient Specialist/PCP:                                                                                                             PCP: Cait Graf CNP 03/10/25  presents for Annual Exam (Head concern possible cyst 1month ago ). 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 Last EMB benign. She is interested in definitive therapy with hysterectomy/BS     - She will need PAT  - She is a candidate for same day DC   - Would plan for surgery in        Endocrinolgy: Carlos Russo 24 follow up of Multinodular Goiter, Insulin Resistance, obesity,gestational diabetes           ______________________________________________________________  Medication instructions:   Instructed to hold Vitamins, Supplements and Ibuprofen 7 days prior to surgery       Queta Esparza LPN  Preadmission Testing            Assessment and Plan:     {[facility] ASSESSMENT AND PLAN:97592}             [1]   Past Medical History:  Diagnosis Date    Body mass index (BMI) 50.0-59.9, adult (Multi)     Calculus of gallbladder without cholecystitis without obstruction 2019    Gall stones    Endometrial intraepithelial neoplasia (EIN)     Endometrial hyperplasia with atypia    GERD (gastroesophageal reflux disease)     Hx gestational diabetes     Impaired fasting glucose     Insulin resistance     Morbid (severe) obesity due to excess calories (Multi)     Nontoxic multinodular goiter     Obesity, Class III, BMI 40-49.9 (morbid obesity)     PCOS (polycystic ovarian syndrome)     Personal history of gestational diabetes 2019    History of gestational diabetes mellitus (GDM)    Primary hypertension     Single liveborn infant, delivered by  2019    Liveborn by     Type 2 diabetes mellitus    [2]   Past Surgical History:  Procedure Laterality Date     SECTION, LOW TRANSVERSE  [id number]    CHOLECYSTECTOMY      COLONOSCOPY      OTHER SURGICAL HISTORY  2020    Uterine polypectomy    WISDOM TOOTH EXTRACTION     [3]   Family History  Problem Relation Name Age of Onset    COPD Mother Vanessa     Diabetes Mother Vanessa     Hypertension Mother Vanessa     Hypertension Father Christian     Drug abuse Sister Aditi     Eclampsia Sister Aditi      labor Sister Aditi     Drug abuse Brother Tono     Breast cancer Mother's Sister Kayley     Cancer Mother's Sister Kayley     COPD Maternal Grandmother Avelina     Diabetes Maternal Grandmother Avelina     Cancer Maternal Grandmother Avelina     Cancer Paternal Grandmother Janelle    [4]   Allergies  Allergen Reactions     Azithromycin Insomnia, Other and Unknown     tachycardia    Potassium Sulfate Unknown

## 2025-06-11 ENCOUNTER — PRE-ADMISSION TESTING (OUTPATIENT)
Dept: PREADMISSION TESTING | Facility: HOSPITAL | Age: 40
End: 2025-06-11
Payer: COMMERCIAL

## 2025-06-11 VITALS
SYSTOLIC BLOOD PRESSURE: 131 MMHG | WEIGHT: 293 LBS | HEART RATE: 84 BPM | BODY MASS INDEX: 43.4 KG/M2 | DIASTOLIC BLOOD PRESSURE: 86 MMHG | HEIGHT: 69 IN | TEMPERATURE: 98 F | OXYGEN SATURATION: 100 %

## 2025-06-11 DIAGNOSIS — N85.00 ENDOMETRIAL HYPERPLASIA: Primary | ICD-10-CM

## 2025-06-11 DIAGNOSIS — E66.01 MORBID (SEVERE) OBESITY DUE TO EXCESS CALORIES (MULTI): ICD-10-CM

## 2025-06-11 DIAGNOSIS — I10 PRIMARY HYPERTENSION: ICD-10-CM

## 2025-06-11 LAB
ABO GROUP (TYPE) IN BLOOD: NORMAL
ANION GAP SERPL CALC-SCNC: 14 MMOL/L (ref 10–20)
ANTIBODY SCREEN: NORMAL
BUN SERPL-MCNC: 11 MG/DL (ref 6–23)
CALCIUM SERPL-MCNC: 9.4 MG/DL (ref 8.6–10.6)
CHLORIDE SERPL-SCNC: 107 MMOL/L (ref 98–107)
CO2 SERPL-SCNC: 23 MMOL/L (ref 21–32)
CREAT SERPL-MCNC: 0.69 MG/DL (ref 0.5–1.05)
EGFRCR SERPLBLD CKD-EPI 2021: >90 ML/MIN/1.73M*2
ERYTHROCYTE [DISTWIDTH] IN BLOOD BY AUTOMATED COUNT: 13.7 % (ref 11.5–14.5)
GLUCOSE SERPL-MCNC: 85 MG/DL (ref 74–99)
HCT VFR BLD AUTO: 41.8 % (ref 36–46)
HGB BLD-MCNC: 12.9 G/DL (ref 12–16)
MCH RBC QN AUTO: 27.3 PG (ref 26–34)
MCHC RBC AUTO-ENTMCNC: 30.9 G/DL (ref 32–36)
MCV RBC AUTO: 88 FL (ref 80–100)
NRBC BLD-RTO: 0 /100 WBCS (ref 0–0)
PLATELET # BLD AUTO: 345 X10*3/UL (ref 150–450)
POTASSIUM SERPL-SCNC: 5.2 MMOL/L (ref 3.5–5.3)
RBC # BLD AUTO: 4.73 X10*6/UL (ref 4–5.2)
RH FACTOR (ANTIGEN D): NORMAL
SODIUM SERPL-SCNC: 139 MMOL/L (ref 136–145)
WBC # BLD AUTO: 7.8 X10*3/UL (ref 4.4–11.3)

## 2025-06-11 PROCEDURE — 85027 COMPLETE CBC AUTOMATED: CPT

## 2025-06-11 PROCEDURE — 93005 ELECTROCARDIOGRAM TRACING: CPT

## 2025-06-11 PROCEDURE — 86901 BLOOD TYPING SEROLOGIC RH(D): CPT

## 2025-06-11 PROCEDURE — 99204 OFFICE O/P NEW MOD 45 MIN: CPT | Performed by: NURSE PRACTITIONER

## 2025-06-11 PROCEDURE — 80048 BASIC METABOLIC PNL TOTAL CA: CPT

## 2025-06-11 PROCEDURE — 36415 COLL VENOUS BLD VENIPUNCTURE: CPT

## 2025-06-11 ASSESSMENT — DUKE ACTIVITY SCORE INDEX (DASI)
DASI METS SCORE: 9.9
CAN YOU DO YARD WORK LIKE RAKING LEAVES, WEEDING OR PUSHING A MOWER: YES
CAN YOU PARTICIPATE IN MODERATE RECREATIONAL ACTIVITIES LIKE GOLF, BOWLING, DANCING, DOUBLES TENNIS OR THROWING A BASEBALL OR FOOTBALL: YES
CAN YOU WALK INDOORS, SUCH AS AROUND YOUR HOUSE: YES
CAN YOU DO HEAVY WORK AROUND THE HOUSE LIKE SCRUBBING FLOORS OR LIFTING AND MOVING HEAVY FURNITURE: YES
CAN YOU DO LIGHT WORK AROUND THE HOUSE LIKE DUSTING OR WASHING DISHES: YES
CAN YOU HAVE SEXUAL RELATIONS: YES
CAN YOU DO MODERATE WORK AROUND THE HOUSE LIKE VACUUMING, SWEEPING FLOORS OR CARRYING GROCERIES: YES
CAN YOU RUN A SHORT DISTANCE: YES
CAN YOU WALK A BLOCK OR TWO ON LEVEL GROUND: YES
TOTAL_SCORE: 58.2
CAN YOU TAKE CARE OF YOURSELF (EAT, DRESS, BATHE, OR USE TOILET): YES
CAN YOU PARTICIPATE IN STRENOUS SPORTS LIKE SWIMMING, SINGLES TENNIS, FOOTBALL, BASKETBALL, OR SKIING: YES
CAN YOU CLIMB A FLIGHT OF STAIRS OR WALK UP A HILL: YES

## 2025-06-11 ASSESSMENT — ENCOUNTER SYMPTOMS
GASTROINTESTINAL NEGATIVE: 1
CONSTITUTIONAL NEGATIVE: 1
MUSCULOSKELETAL NEGATIVE: 1
NEUROLOGICAL NEGATIVE: 1
ENDOCRINE NEGATIVE: 1
RESPIRATORY NEGATIVE: 1
EYES NEGATIVE: 1
NECK NEGATIVE: 1
CARDIOVASCULAR NEGATIVE: 1

## 2025-06-11 ASSESSMENT — LIFESTYLE VARIABLES: SMOKING_STATUS: NONSMOKER

## 2025-06-11 NOTE — PREPROCEDURE INSTRUCTIONS
Fasting Guidelines    Why must I stop eating and drinking near surgery time?  With sedation, food or liquid in your stomach can enter your lungs causing serious complications  Increases nausea and vomiting  You are currently on a medication classified as a GLP-1 inhibitor (Ozempic). This medication can slow the movement of food through your stomach and intestines. This further increase the risk of food entering your lungs with anesthesia.    When do I need to stop eating and drinking before my surgery?  To help ensure food has passed out of your stomach, START a clear liquid diet 24 hours before your surgery.  On the day of your surgery/procedure, STOP all clear liquids 2 hours before your arrival time to the hospital.  Examples of clear liquids are clear, fat-free broth, clear nutritional drinks, pulp-free popsicles, vegetable and fruit juice, gelatin, coffee and tea without creamer or milk, clear soda and sports drinks (for example-Gatorade)    Diabetic patients should not use sugar free clear liquids. Check your blood glucose as you normally would or if you develop symptoms of low or woo blood glucose levels    Symptoms of low blood glucose (hypoglycemia) include: shakiness, sweating, dizziness, confusion.   Symptoms of high blood glucose (hyperglycemia) include: dry mouth, excessive thirst, frequent urination, blurry vision)  For low blood glucose increase your consumption of sugar-containing clear liquid  For high blood glucose, decrease your consumption of sugar-containing clears and tereat as you normally would  If symptoms persist seek medical attention    CONTACT SURGEON'S OFFICE IF YOU DEVELOP:  * Fever = 100.4 F   * New respiratory symptoms (e.g. cough, shortness of breath, respiratory distress, sore throat)  * Recent loss of taste or smell  *Flu like symptoms such as headache, fatigue or gastrointestinal symptoms  * You develop any open sores, shingles, burning or painful urination   AND/OR:  * You  no longer wish to have the surgery.  * Any other personal circumstances change that may lead to the need to cancel or defer this surgery.  *You were admitted to any hospital within one week of your planned procedure.    Seven/Six Days before Surgery:  Review your medication instructions, stop indicated medications    Day of Surgery:  Review your medication instructions, take indicated medications  Wear comfortable loose fitting clothing  Do not use moisturizers, creams, lotions or perfume  All jewelry and valuables should be left at home    [facility]  [facility]  [phone]  [phone]  Email-[email or web address]      Preoperative Brain Exercises    What are brain exercises?  A brain exercise is any activity that engages your thinking (cognitive) skills.    What types of activities are considered brain exercises?  Jigsaw puzzles, crossword puzzles, word jumble, memory games, word search, and many more.  Many can be found free online or on your phone via a mobile jayson.    Why should I do brain exercises before my surgery?  More recent research has shown brain exercise before surgery can lower the risk of postoperative delirium (confusion) which can be especially important for older adults.  Patients who did brain exercises for 5 to 10 hours the days before surgery, cut their risk of postoperative delirium in half up to 1 week after surgery.         [facility]    Preoperative Deep Breathing Exercises    Why it is important to do deep breathing exercises before my surgery?  Deep breathing exercises strengthen your breathing muscles.  This helps you to recover after your surgery and decreases the chance of breathing complications.      How are the deep breathing exercises done?  Sit straight with your back supported.  Breathe in deeply and slowly through your nose. Your lower rib cage should expand and your abdomen may move  forward.  Hold that breath for 3 to 5 seconds.  Breathe out through pursed lips, slowly and completely.  Rest and repeat 10 times every hour while awake.  Rest longer if you become dizzy or lightheaded.         Patient and Family Education             Ways You Can Help Prevent Blood Clots             This handout explains some simple things you can do to help prevent blood clots.      Blood clots are blockages that can form in the body's veins. When a blood clot forms in your deep veins, it may be called a deep vein thrombosis, or DVT for short. Blood clots can happen in any part of the body where blood flows, but they are most common in the arms and legs. If a piece of a blood clot breaks free and travels to the lungs, it is called a pulmonary embolus (PE). A PE can be a very serious problem.         Being in the hospital or having surgery can raise your chances of getting a blood clot because you may not be well enough to move around as much as you normally do.         Ways you can help prevent blood clots in the hospital         Wearing SCDs. SCDs stands for Sequential Compression Devices.   SCDs are special sleeves that wrap around your legs  They attach to a pump that fills them with air to gently squeeze your legs every few minutes.   This helps return the blood in your legs to your heart.   SCDs should only be taken off when walking or bathing.   SCDs may not be comfortable, but they can help save your life.               Wearing compression stockings - if your doctor orders them. These special snug fitting stockings gently squeeze your legs to help blood flow.       Walking. Walking helps move the blood in your legs.   If your doctor says it is ok, try walking the halls at least   5 times a day. Ask us to help you get up, so you don't fall.      Taking any blood thinning medicines your doctor orders.        Page 1 of 2     [facility]; 3/23   Ways you can help prevent blood clots at home        Wearing compression stockings - if your doctor orders them. ? Walking - to help move the blood in your legs.       Taking any blood thinning medicines your doctor orders.      Signs of a blood clot or PE      Tell your doctor or nurse know right away if you have of the problems listed below.    If you are at home, seek medical care right away. Call 911 for chest pain or problems breathing.               Signs of a blood clot (DVT) - such as pain,  swelling, redness or warmth in your arm or leg      Signs of a pulmonary embolism (PE) - such as chest     pain or feeling short of breath

## 2025-06-11 NOTE — CPM/PAT H&P
CPM/PAT Evaluation       Name: Karlee Beck (Karlee Beck)  /Age: 1985/40 y.o.     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 Last EMB benign. She now wishes to proceed with definitive treatment. She is referred today by Dr. Amaya Arreguin for perioperative evaluation in anticipation of total robotic hysterectomy, bilateral salpingectomy, any other indicated procedures on 25 with Dr. Demetria Arreguin.     Medical History[1]    Surgical History[2]    Patient  reports being sexually active and has had partner(s) who are male. She reports using the following method of birth control/protection: None.    Family History[3]    Allergies[4]      Prior to Admission medications    Medication Sig Start Date End Date Taking? Authorizing Provider   amLODIPine (Norvasc) 5 mg tablet Take 1 tablet (5 mg) by mouth once daily. 3/10/25   LASHAY Michel-SHAGUFTA   medroxyPROGESTERone (Provera) 10 mg tablet Take 1 tablet (10 mg) by mouth once daily. 24  Amaya Kirk MD   metFORMIN  mg 24 hr tablet 3 pills daily, Do not crush, chew, or split. 25   Carlos Russo MD   multivit-min/ferrous fumarate (MULTI VITAMIN ORAL) 0 Refill(s), Type: Maintenance 18   Historical Provider, MD   semaglutide (Ozempic) 1 mg/dose (4 mg/3 mL) pen injector Inject 1 mg under the skin 1 (one) time per week. 24  Carlos Russo MD        PAT ROS:   Constitutional:   neg    Neuro/Psych:   neg    Eyes:   neg    Ears:   neg    Nose:   neg    Mouth:   neg    Throat:   neg    Neck:   neg    Cardio:   neg    Respiratory:   neg    Endocrine:   neg    GI:   neg    :   neg    Musculoskeletal:   neg    Hematologic:   neg    Skin:  neg        Physical Exam  Vitals reviewed.  "  Constitutional:       Appearance: Normal appearance.   HENT:      Head: Normocephalic and atraumatic.      Nose: Nose normal.      Mouth/Throat:      Mouth: Mucous membranes are moist.      Pharynx: Oropharynx is clear.   Eyes:      Extraocular Movements: Extraocular movements intact.      Conjunctiva/sclera: Conjunctivae normal.      Pupils: Pupils are equal, round, and reactive to light.   Cardiovascular:      Rate and Rhythm: Normal rate and regular rhythm.      Pulses: Normal pulses.      Heart sounds: Normal heart sounds.   Pulmonary:      Effort: Pulmonary effort is normal.      Breath sounds: Normal breath sounds.   Musculoskeletal:         General: Normal range of motion.      Cervical back: Normal range of motion.   Skin:     General: Skin is warm and dry.   Neurological:      General: No focal deficit present.      Mental Status: She is alert and oriented to person, place, and time.   Psychiatric:         Mood and Affect: Mood normal.         Behavior: Behavior normal.          PAT AIRWAY:   Airway:     Mallampati::  III    TM distance::  >3 FB    Neck ROM::  Full  normal        Visit Vitals  /86   Pulse 84   Temp 36.7 °C (98 °F)   Ht 1.753 m (5' 9\")   Wt (!) 168 kg (369 lb 11.2 oz)   SpO2 100%   BMI 54.60 kg/m²   OB Status Having periods   Smoking Status Never   BSA 2.86 m²       DASI Risk Score      Flowsheet Row Pre-Admission Testing from 6/11/2025 in [facility] Questionnaire Series Submission from 5/13/2025 in [facility] with Generic Provider Natasha   Can you take care of yourself (eat, dress, bathe, or use toilet)?  2.75 filed at 06/11/2025 0820 2.75  filed at 05/13/2025 0614   Can you walk indoors, such as around your house? 1.75 filed at 06/11/2025 0820 1.75  filed at 05/13/2025 0614   Can you walk a block or two on level ground?  2.75 filed at 06/11/2025 0820 2.75  filed at 05/13/2025 0614   Can you climb a flight of stairs or walk up a hill? 5.5 filed at 06/11/2025 0820 " 5.5  filed at 05/13/2025 0614   Can you run a short distance? 8 filed at 06/11/2025 0820 8  filed at 05/13/2025 0614   Can you do light work around the house like dusting or washing dishes? 2.7 filed at 06/11/2025 0820 2.7  filed at 05/13/2025 0614   Can you do moderate work around the house like vacuuming, sweeping floors or carrying groceries? 3.5 filed at 06/11/2025 0820 3.5  filed at 05/13/2025 0614   Can you do heavy work around the house like scrubbing floors or lifting and moving heavy furniture?  8 filed at 06/11/2025 0820 8  filed at 05/13/2025 0614   Can you do yard work like raking leaves, weeding or pushing a mower? 4.5 filed at 06/11/2025 0820 4.5  filed at 05/13/2025 0614   Can you have sexual relations? 5.25 filed at 06/11/2025 0820 5.25  filed at 05/13/2025 0614   Can you participate in moderate recreational activities like golf, bowling, dancing, doubles tennis or throwing a baseball or football? 6 filed at 06/11/2025 0820 6  filed at 05/13/2025 0614   Can you participate in strenous sports like swimming, singles tennis, football, basketball, or skiing? 7.5 filed at 06/11/2025 0820 --   DASI SCORE 58.2 filed at 06/11/2025 0820 --   METS Score (Will be calculated only when all the questions are answered) 9.9 filed at 06/11/2025 0820 --          Caprini DVT Assessment      Flowsheet Row Pre-Admission Testing from 6/11/2025 in [facility]   DVT Score (IF A SCORE IS NOT CALCULATING, MUST SELECT A BMI TO COMPLETE) 11 filed at 06/11/2025 0835   Medical Factors Present cancer, chemotherapy, or previous malignancy filed at 06/11/2025 0835   Surgical Factors Major surgery planned, lasting over 3 hours filed at 06/11/2025 0835   BMI (BMI MUST BE CHOSEN) 41-50 (Morbid obesity) filed at 06/11/2025 0835          Modified Frailty Index    No data to display       [id number] Stroke Risk Points         N/A 0 to 9 Points:      Last Change: N/A          The [id number] risk score (Lip GH, et al.  2009. © 2010 [employer]) quantifies the risk of stroke for a patient with atrial fibrillation. For patients without atrial fibrillation or under the age of 18 this score appears as N/A. Higher score values generally indicate higher risk of stroke.        This score is not applicable to this patient. Components are not calculated.          Revised Cardiac Risk Index      Flowsheet Row Pre-Admission Testing from 6/11/2025 in [facility]   High-Risk Surgery (Intraperitoneal, Intrathoracic,Suprainguinal vascular) 1 filed at 06/11/2025 0836   History of ischemic heart disease (History of MI, History of positive exercuse test, Current chest paint considered due to myocardial ischemia, Use of nitrate therapy, ECG with pathological Q Waves) 0 filed at 06/11/2025 0836   History of congestive heart failure (pulmonary edemia, bilateral rales or S3 gallop, Paroxysmal nocturnal dyspnea, CXR showing pulmonary vascular redistribution) 0 filed at 06/11/2025 0836   History of cerebrovascular disease (Prior TIA or stroke) 0 filed at 06/11/2025 0836   Pre-operative insulin treatment 0 filed at 06/11/2025 0836   Pre-operative creatinine>2 mg/dl 0 filed at 06/11/2025 0836   Revised Cardiac Risk Calculator 1 filed at 06/11/2025 0836          Apfel Simplified Score      Flowsheet Row Pre-Admission Testing from 6/11/2025 in [facility]   Smoking status 1 filed at 06/11/2025 0836   History of motion sickness or PONV  0 filed at 06/11/2025 0836   Use of postoperative opioids 1 filed at 06/11/2025 0836   Gender - Female 1=Yes filed at 06/11/2025 0836   Apfel Simplified Score Calculator 3 filed at 06/11/2025 0836          Risk Analysis Index Results This Encounter    No data found in the last 10 encounters.       Stop Bang Score      Flowsheet Row Pre-Admission Testing from 6/11/2025 in [facility] Questionnaire Series Submission from 5/13/2025 in [facility]3] with  Generic Provider Mychart   Do you snore loudly? 1 filed at 2025 1  filed at 2025   Do you often feel tired or fatigued after your sleep? 0 filed at 2025 0  filed at 2025   Has anyone ever observed you stop breathing in your sleep? 0 filed at 2025 0  filed at 2025   Do you have or are you being treated for high blood pressure? 1 filed at 2025 1  filed at 2025   Recent BMI (Calculated) 53.6 filed at 2025 53.6  filed at 2025   Is BMI greater than 35 kg/m2? 1=Yes filed at 2025 1=Yes  filed at 2025   Age older than 50 years old? 0=No filed at 2025 0=No  filed at 2025   Is your neck circumference greater than 17 inches (Male) or 16 inches (Female)? 1 filed at 2025 --   Gender - Male 0=No filed at 2025 0=No  filed at 2025   STOP-BANG Total Score 4 filed at 2025 --          Prodigy: High Risk  Total Score: 0          ARISCAT Score for Postoperative Pulmonary Complications      Flowsheet Row Pre-Admission Testing from 2025 in [facility]   Age Calculated Score 0 filed at 202536   Preoperative SpO2 0 filed at 2025   Respiratory infection in the last month Either upper or lower (i.e., URI, bronchitis, pneumonia), with fever and antibiotic treatment 0 filed at 2025   Preoperative anemia (Hgb less than 10 g/dl) 0 filed at 2025 0836   Surgical incision  0 filed at 2025 0836   Duration of surgery  16 filed at 2025 08   Emergency Procedure  0 filed at 2025 0836   ARISCAT Total Score  16 filed at 2025 0836          Kathryn Perioperative Risk for Myocardial Infarction or Cardiac Arrest (JOSE R)    No data to display     --Testing/Diagnostic:        - EK24      - Echo: 01/10/19  Exam quality: technically difficult   There is normal left ventricular  systolic function.   Estimated ejection fraction is >60%.   The left atrial size is mildly dilated.   No evidence of aortic regurgitation.   A trace of mitral regurgitation.   A trace of tricuspid regurgitation.    - US Thyroid: 02/16/24  IMPRESSION:  No significant change in left-sided thyroid nodules as follows:  -0.5 x 0.4 x 0.5 cm (previously measuring 0.8 x 0.4 x 0.5 cm) left  lateral thyroid nodule, solid, hypoechoic. TR 4. -0.5 x 0.5 x 0.6 cm  (previously 0.5 x 0.5 x 0.5 cm) left medial thyroid nodule, solid,  hypoechoic. TR 4.    Patient Specialist/PCP:                                                                                                       PCP: Cait Graf CNP 03/10/25  presents for Annual Exam (Head concern possible cyst 1month ago ). 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 Last EMB benign. She is interested in definitive therapy with hysterectomy/BS     - She will need PAT  - She is a candidate for same day DC   - Would plan for surgery in June     Assessment and Plan:     Anesthesia  The patient denies problems with anesthesia in the past such as PONV, prolonged sedation, awareness, dental damage, aspiration, cardiac arrest, difficult intubation, or unexpected hospital admissions.     Airway  The patient is at increased risk of airway difficulty secondary to obesity, short thick neck. No documented or reported history of airway difficulty.     Neurology  The patient has no neurological diagnoses or significant findings on chart review, clinical presentation, and evaluation. No grossly apparent neurological perioperative risk. The patient is at increased risk for perioperative stroke secondary to hypertension , female gender, diabetes mellitus, general anesthesia,  operative time >2.5 hours. Handouts for preoperative brain exercises given to patient.    HEENT  No diagnoses or significant findings on chart review or clinical presentation and evaluation.    Cardiovascular  The patient has hypertension managed on  amlodipineinstructed to continue as prescribed in the perioperative period. BP today 131/86.     METS  The patient's functional capacity is greater than 4 METS.  RCRI  The patient meets 1 RCRI criteria and therefore has a 6% risk of major adverse cardiac complications.  JOSE R score which indicates a 0.1% risk of intraoperative or 30-day postoperative MACE (major adverse cardiac event).    Cardiology Evaluation  The patient is not followed by cardiology.    She is scheduled for non-cardiac surgery associated with elevated risk. The patient has no major cardiac contraindications to non- cardiac surgery.    Pulmonary  No significant findings on chart review or clinical presentation and evaluation. The patient is at increased risk of perioperative pulmonary complications secondary to morbid obesity.    STOP BANG 4, which places patient at intermediate risk for having BTEHEL.  ARISCAT 16, low, 1.6% risk of in-hospital postoperative pulmonary complications  PRODIGY 0, low risk of respiratory depression episode.     Patient given PI sheet for preoperative deep breathing exercises.  Encourage  incentive spirometry in the postoperative period as deemed necessary.    Endocrine  The patient has PCOS, morbid obestiy, multinodular thyroid goiter.She is currently managed on metformin, ozempic (hold day of surgery). NPO instructions for GLP1 inhibitors reviewed with patient. Verbalized understanding. Followed by endocrinology.     Endocrinolgy: Carlos Russo 02/17/25 follow up of Multinodular Goiter, Insulin Resistance, obesity,gestational diabetes      Gastrointestinal  No diagnoses or significant findings on chart review or clinical presentation and evaluation.    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 Scheduled for surgery with Dr. Arreguin on 6/16/25.    Renal  No renal diagnoses or significant findings on chart review or clinical presentation and evaluation.     The patient is scheduled for peritoneal surgery which places patient at higher risk of perioperative renal complications.     The patient has specific risk factors associated with increased risk of perioperative renal complications related to hypertension, diabetes mellitus. Preventative measures include preoperative hydration.    Hematology  No diagnoses or significant findings on chart review or clinical presentation and evaluation.    Caprini score 11, high risk of perioperative VTE.     Patient instructed to ambulate as soon as possible postoperatively to decrease thromboembolic risk. Initiate mechanical DVT prophylaxis as soon as possible and initiate chemical prophylaxis when deemed safe from a bleeding standpoint post surgery.     Transfusion Evaluation  A type and screen was obtained given the likelihood for perioperative transfusion of blood or blood products.    Musculoskeletal  No diagnoses or significant findings on chart review or clinical presentation and evaluation.    ID  No diagnoses or significant findings on chart review or clinical presentation and evaluation.    -Preoperative medication instructions were provided and reviewed with the patient.  Any additional testing or evaluation was explained to the patient.  NPO Instructions were discussed, and the patient's questions were answered prior to conclusion of this encounter. Patient verbalized understanding of preoperative instructions. After Visit Summary given.      Labs ordered and reviewed  CBC, BMP, and Type and screen    No further work up indicated.               [1]   Past Medical History:  Diagnosis Date    Anxiety     Body mass index (BMI) 50.0-59.9, adult (Multi)     Calculus of gallbladder without cholecystitis without obstruction 2019    Gall stones    Endometrial hyperplasia     seen by Dr. Demetria Arreguin 25    Endometrial intraepithelial neoplasia (EIN)     Endometrial hyperplasia with atypia    GERD (gastroesophageal reflux disease)     H/O thyroid nodule     Thyroid US 24    Hx gestational diabetes     Impaired fasting glucose     Insulin resistance     Morbid (severe) obesity due to excess calories (Multi)     Nontoxic multinodular goiter     seen by Dr. Carlos Russo 25    Obesity, Class III, BMI 40-49.9 (morbid obesity)     PCOS (polycystic ovarian syndrome)     Personal history of gestational diabetes 2019    History of gestational diabetes mellitus (GDM)    Polycystic ovarian disease     treated with Metformin    Pre-diabetes     managed with Ozempic    Primary hypertension     Single liveborn infant, delivered by  2019    Liveborn by    [2]   Past Surgical History:  Procedure Laterality Date    CERVICAL POLYPECTOMY       SECTION, LOW TRANSVERSE  [id number]    CHOLECYSTECTOMY      COLONOSCOPY      OTHER SURGICAL HISTORY  2020    Uterine polypectomy    WISDOM TOOTH EXTRACTION     [3]   Family History  Problem Relation Name Age of Onset    COPD Mother Vanessa     Diabetes Mother Vanessa     Hypertension Mother Vanessa     Hypertension Father Christian     Drug abuse Sister Aditi     Eclampsia Sister Aditi      labor Sister Aditi     Drug abuse Brother Tono     Breast cancer Mother's Sister Ivan     Cancer Mother's Sister Ivan     COPD Maternal Grandmother Avelina     Diabetes Maternal Grandmother Avelina     Cancer Maternal Grandmother Avelina     Cancer Paternal Grandmother Janelle     Breast cancer Sister Maternal aunt ivan- not my sister    [4]   Allergies  Allergen Reactions     Azithromycin Insomnia, Other and Unknown     tachycardia    Potassium Sulfate Unknown

## 2025-06-13 NOTE — HOSPITAL COURSE
[ ] Consent - NEEDS    Gynecologic Oncology H&P    Karlee Beck is a 40 y.o. 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   PMHx notable for HTN on amlodipine, PCOS on metformin and ozempic (held prior to surgery), obesity (BMI 55), anxiety, gallstones, GERD, nontoxic goiter.    Pre-op labs: (6/11) Hgb12.9, plts 345, Cr 0.69  PAT (6/11): Caprini score 11, high risk of perioperative VTE.     -    GYO History:    Tumor History:  - 9/2020: CAH bordering on G1 EAC, on Megace  - 1/2021: EMB with progesterone effect   - 4/2021: EMB with progesterone effect  - 11/2021: EMB with hyperplasia without atypia, MARIA EUGENIA 3 noted underwent subsequent pap and ECC which were wnl  - 4/2022: EMB with secretory endometrium   - 10/22: Focal endometrial hyperplasia  - 2/23: EMB benign  - 11/23 EMB benign  - 5/2024 EMB benign    [facility] Reports:    5/14/2024:   A. ENDOMETRIUM, BIOPSY:   -- Superficial fragments of proliferative pattern endometrium with tubal metaplasia; too scant to further evaluate, see comment    11/07/2023:  A. ENDOMETRIUM, BIOPSY:   -- Scant proliferative pattern endometrium with tubal and eosinophilic metaplasia and stromal breakdown; too fragmented to further evaluate, see note  -- Scant endocervical mucosa with chronic inflammation and cytologically normal squamous epithelium     Note: No intact fragments of endometrium are identified, limiting definitive evaluation for hyperplasia.  No significant cytologic atypia is noted.  There is no histologic evidence of exogenous progestin effect.     9/2020  A.  ENDOMETRIUM, BIOPSY:   -- ATYPICAL ENDOMETRIAL HYPERPLASIA WITH SQUAMOUS MORULAR METAPLASIA, BORDERING   ON FIGO GRADE 1 ENDOMETRIOID ADENOCARCINOMA.     Past Medical History:   has a past medical history of Anxiety, Body  mass index (BMI) 50.0-59.9, adult (Multi), Calculus of gallbladder without cholecystitis without obstruction (2019), Endometrial hyperplasia, Endometrial intraepithelial neoplasia (EIN), GERD (gastroesophageal reflux disease), H/O thyroid nodule, gestational diabetes, Impaired fasting glucose, Insulin resistance, Morbid (severe) obesity due to excess calories (Multi), Nontoxic multinodular goiter, Obesity, Class III, BMI 40-49.9 (morbid obesity), PCOS (polycystic ovarian syndrome), Personal history of gestational diabetes (2019), Polycystic ovarian disease, Pre-diabetes, Primary hypertension, and Single liveborn infant, delivered by  (2019).    Surgical History:  Past Surgical History:   Procedure Laterality Date    CERVICAL POLYPECTOMY       SECTION, LOW TRANSVERSE  [id number]    CHOLECYSTECTOMY      COLONOSCOPY      OTHER SURGICAL HISTORY  2020    Uterine polypectomy    WISDOM TOOTH EXTRACTION          OB/GYN history:   4 para 1031    OB History    Para Term  AB Living   1 1 1      SAB IAB Ectopic Multiple Live Births             # Outcome Date GA Lbr Zeeshan/2nd Weight Sex Type Anes PTL Lv   1 Term                Social History:  Social History     Socioeconomic History    Marital status:    Tobacco Use    Smoking status: Never     Passive exposure: Never    Smokeless tobacco: Never   Substance and Sexual Activity    Alcohol use: Yes     Alcohol/week: 2.0 standard drinks of alcohol     Types: 1 Cans of beer, 1 Standard drinks or equivalent per week     Comment: Not regular drinker    Drug use: Not Currently    Sexual activity: Yes     Partners: Male     Birth control/protection: None     Comment: - cant get pregnant.        Family History:  Family History   Problem Relation Name Age of Onset    COPD Mother Vanessa     Diabetes Mother Vanessa     Hypertension Mother Vanessa     Hypertension Father Christian     Drug abuse Sister Aditi     Eclampsia  Sister Aditi      labor Sister Aditi     Drug abuse Brother Tono     Breast cancer Mother's Sister Ivan     Cancer Mother's Sister Ivan     COPD Maternal Grandmother Avelina     Diabetes Maternal Grandmother Avelina     Cancer Maternal Grandmother Avelina     Cancer Paternal Grandmother Janelle     Breast cancer Sister Maternal aunt ivan- not my sister         Medications:  Prior to Admission medications    Medication Sig Start Date End Date Taking? Authorizing Provider   amLODIPine (Norvasc) 5 mg tablet Take 1 tablet (5 mg) by mouth once daily. 3/10/25   LASHAY Michel-SHAGUFTA   medroxyPROGESTERone (Provera) 10 mg tablet Take 1 tablet (10 mg) by mouth once daily. 24  Amaya Kirk MD   metFORMIN  mg 24 hr tablet 3 pills daily, Do not crush, chew, or split. 25   Carlos Russo MD   multivit-min/ferrous fumarate (MULTI VITAMIN ORAL) 0 Refill(s), Type: Maintenance 18   Historical Provider, MD   semaglutide (Ozempic) 1 mg/dose (4 mg/3 mL) pen injector Inject 1 mg under the skin 1 (one) time per week. 24  Carlos Russo MD       Allergies:  Azithromycin and Potassium sulfate    Objective  There were no vitals taken for this visit.   ***    Physical exam: ***  General: no acute distress  HEENT: normocephalic, atraumatic  CV: warm and well perfused  Lungs: breathing comfortably on room air  Abdomen: soft, non-tender  Extremities: moving all extremities spontaneously  Neuro: awake and conversant  Psych: appropriate mood and affect      Lab Review  Lab Results   Component Value Date    WBC 7.8 2025    HGB 12.9 2025    HCT 41.8 2025     2025    CHOL 169 2025    TRIG 70 2025    HDL 46 (L) 2025    ALT 15 2025    AST 14 2025     2025    K 5.2 2025     2025    CREATININE 0.69 2025    BUN 11 2025    CO2 23 2025    TSH 3.51 2025    HGBA1C 5.6 2025          Imaging  See Tumor History above    Assessment & Plan     Karlee Beck is a 40 y.o. with atypical endometrial hyperplasia who desires definitive treatment with total robotic hysterectomy, bilateral salpingectomy, any other indicated procedures.     Plan to proceed with procedure.    Anticipated discharge plan: anticipate same day discharge     To be seen and d/w Dr. Arreguin    Obstetrics & Gynecology   Gynecologic Oncology  Pager [phone]  Phone [phone]

## 2025-06-16 ENCOUNTER — ANESTHESIA EVENT (OUTPATIENT)
Dept: OPERATING ROOM | Facility: HOSPITAL | Age: 40
End: 2025-06-16
Payer: COMMERCIAL

## 2025-06-16 ENCOUNTER — HOSPITAL ENCOUNTER (OUTPATIENT)
Facility: HOSPITAL | Age: 40
Setting detail: OUTPATIENT SURGERY
Discharge: HOME | End: 2025-06-16
Attending: STUDENT IN AN ORGANIZED HEALTH CARE EDUCATION/TRAINING PROGRAM | Admitting: STUDENT IN AN ORGANIZED HEALTH CARE EDUCATION/TRAINING PROGRAM
Payer: COMMERCIAL

## 2025-06-16 ENCOUNTER — ANESTHESIA (OUTPATIENT)
Dept: OPERATING ROOM | Facility: HOSPITAL | Age: 40
End: 2025-06-16
Payer: COMMERCIAL

## 2025-06-16 VITALS
RESPIRATION RATE: 16 BRPM | SYSTOLIC BLOOD PRESSURE: 135 MMHG | WEIGHT: 293 LBS | TEMPERATURE: 99.3 F | HEART RATE: 90 BPM | OXYGEN SATURATION: 97 % | BODY MASS INDEX: 53.95 KG/M2 | DIASTOLIC BLOOD PRESSURE: 87 MMHG

## 2025-06-16 DIAGNOSIS — N85.00 ENDOMETRIAL HYPERPLASIA: ICD-10-CM

## 2025-06-16 DIAGNOSIS — G89.18 POSTOPERATIVE PAIN: ICD-10-CM

## 2025-06-16 DIAGNOSIS — Z98.890 POSTOPERATIVE STATE: Primary | ICD-10-CM

## 2025-06-16 PROCEDURE — 7100000002 HC RECOVERY ROOM TIME - EACH INCREMENTAL 1 MINUTE: Performed by: STUDENT IN AN ORGANIZED HEALTH CARE EDUCATION/TRAINING PROGRAM

## 2025-06-16 PROCEDURE — 2500000004 HC RX 250 GENERAL PHARMACY W/ HCPCS (ALT 636 FOR OP/ED): Performed by: STUDENT IN AN ORGANIZED HEALTH CARE EDUCATION/TRAINING PROGRAM

## 2025-06-16 PROCEDURE — S2900 ROBOTIC SURGICAL SYSTEM: HCPCS | Performed by: STUDENT IN AN ORGANIZED HEALTH CARE EDUCATION/TRAINING PROGRAM

## 2025-06-16 PROCEDURE — 3600000018 HC OR TIME - INITIAL BASE CHARGE - PROCEDURE LEVEL SIX: Performed by: STUDENT IN AN ORGANIZED HEALTH CARE EDUCATION/TRAINING PROGRAM

## 2025-06-16 PROCEDURE — 7100000010 HC PHASE TWO TIME - EACH INCREMENTAL 1 MINUTE: Performed by: STUDENT IN AN ORGANIZED HEALTH CARE EDUCATION/TRAINING PROGRAM

## 2025-06-16 PROCEDURE — 2500000004 HC RX 250 GENERAL PHARMACY W/ HCPCS (ALT 636 FOR OP/ED): Mod: JZ | Performed by: ANESTHESIOLOGY

## 2025-06-16 PROCEDURE — 3600000017 HC OR TIME - EACH INCREMENTAL 1 MINUTE - PROCEDURE LEVEL SIX: Performed by: STUDENT IN AN ORGANIZED HEALTH CARE EDUCATION/TRAINING PROGRAM

## 2025-06-16 PROCEDURE — 2500000005 HC RX 250 GENERAL PHARMACY W/O HCPCS: Performed by: STUDENT IN AN ORGANIZED HEALTH CARE EDUCATION/TRAINING PROGRAM

## 2025-06-16 PROCEDURE — 3700000002 HC GENERAL ANESTHESIA TIME - EACH INCREMENTAL 1 MINUTE: Performed by: STUDENT IN AN ORGANIZED HEALTH CARE EDUCATION/TRAINING PROGRAM

## 2025-06-16 PROCEDURE — 88309 TISSUE EXAM BY PATHOLOGIST: CPT | Mod: TC,SUR | Performed by: STUDENT IN AN ORGANIZED HEALTH CARE EDUCATION/TRAINING PROGRAM

## 2025-06-16 PROCEDURE — A58571 PR LAPAROSCOPY W TOT HYSTERECTUTERUS <=250 GRAM  W TUBE/OVARY

## 2025-06-16 PROCEDURE — 2780000003 HC OR 278 NO HCPCS: Performed by: STUDENT IN AN ORGANIZED HEALTH CARE EDUCATION/TRAINING PROGRAM

## 2025-06-16 PROCEDURE — 2720000007 HC OR 272 NO HCPCS: Performed by: STUDENT IN AN ORGANIZED HEALTH CARE EDUCATION/TRAINING PROGRAM

## 2025-06-16 PROCEDURE — 96372 THER/PROPH/DIAG INJ SC/IM: CPT | Performed by: STUDENT IN AN ORGANIZED HEALTH CARE EDUCATION/TRAINING PROGRAM

## 2025-06-16 PROCEDURE — 7100000001 HC RECOVERY ROOM TIME - INITIAL BASE CHARGE: Performed by: STUDENT IN AN ORGANIZED HEALTH CARE EDUCATION/TRAINING PROGRAM

## 2025-06-16 PROCEDURE — 7100000009 HC PHASE TWO TIME - INITIAL BASE CHARGE: Performed by: STUDENT IN AN ORGANIZED HEALTH CARE EDUCATION/TRAINING PROGRAM

## 2025-06-16 PROCEDURE — A58571 PR LAPAROSCOPY W TOT HYSTERECTUTERUS <=250 GRAM  W TUBE/OVARY: Performed by: ANESTHESIOLOGY

## 2025-06-16 PROCEDURE — 2500000001 HC RX 250 WO HCPCS SELF ADMINISTERED DRUGS (ALT 637 FOR MEDICARE OP): Performed by: ANESTHESIOLOGY

## 2025-06-16 PROCEDURE — 2500000004 HC RX 250 GENERAL PHARMACY W/ HCPCS (ALT 636 FOR OP/ED)

## 2025-06-16 PROCEDURE — 2500000001 HC RX 250 WO HCPCS SELF ADMINISTERED DRUGS (ALT 637 FOR MEDICARE OP)

## 2025-06-16 PROCEDURE — 3700000001 HC GENERAL ANESTHESIA TIME - INITIAL BASE CHARGE: Performed by: STUDENT IN AN ORGANIZED HEALTH CARE EDUCATION/TRAINING PROGRAM

## 2025-06-16 PROCEDURE — 58571 TLH W/T/O 250 G OR LESS: CPT | Performed by: STUDENT IN AN ORGANIZED HEALTH CARE EDUCATION/TRAINING PROGRAM

## 2025-06-16 RX ORDER — PROPOFOL 10 MG/ML
INJECTION, EMULSION INTRAVENOUS AS NEEDED
Status: DISCONTINUED | OUTPATIENT
Start: 2025-06-16 | End: 2025-06-16

## 2025-06-16 RX ORDER — MAGNESIUM SULFATE HEPTAHYDRATE 500 MG/ML
INJECTION, SOLUTION INTRAMUSCULAR; INTRAVENOUS AS NEEDED
Status: DISCONTINUED | OUTPATIENT
Start: 2025-06-16 | End: 2025-06-16

## 2025-06-16 RX ORDER — HYDROMORPHONE HYDROCHLORIDE 0.2 MG/ML
0.2 INJECTION INTRAMUSCULAR; INTRAVENOUS; SUBCUTANEOUS EVERY 5 MIN PRN
Status: DISCONTINUED | OUTPATIENT
Start: 2025-06-16 | End: 2025-06-16 | Stop reason: HOSPADM

## 2025-06-16 RX ORDER — HYDROMORPHONE HYDROCHLORIDE 1 MG/ML
INJECTION, SOLUTION INTRAMUSCULAR; INTRAVENOUS; SUBCUTANEOUS AS NEEDED
Status: DISCONTINUED | OUTPATIENT
Start: 2025-06-16 | End: 2025-06-16

## 2025-06-16 RX ORDER — GABAPENTIN 600 MG/1
600 TABLET ORAL ONCE
Status: COMPLETED | OUTPATIENT
Start: 2025-06-16 | End: 2025-06-16

## 2025-06-16 RX ORDER — CELECOXIB 200 MG/1
400 CAPSULE ORAL ONCE
Status: COMPLETED | OUTPATIENT
Start: 2025-06-16 | End: 2025-06-16

## 2025-06-16 RX ORDER — ONDANSETRON HYDROCHLORIDE 2 MG/ML
4 INJECTION, SOLUTION INTRAVENOUS ONCE AS NEEDED
Status: DISCONTINUED | OUTPATIENT
Start: 2025-06-16 | End: 2025-06-16 | Stop reason: HOSPADM

## 2025-06-16 RX ORDER — LIDOCAINE HYDROCHLORIDE 10 MG/ML
0.1 INJECTION, SOLUTION EPIDURAL; INFILTRATION; INTRACAUDAL; PERINEURAL ONCE
Status: DISCONTINUED | OUTPATIENT
Start: 2025-06-16 | End: 2025-06-16 | Stop reason: HOSPADM

## 2025-06-16 RX ORDER — CEFAZOLIN 1 G/1
INJECTION, POWDER, FOR SOLUTION INTRAVENOUS AS NEEDED
Status: DISCONTINUED | OUTPATIENT
Start: 2025-06-16 | End: 2025-06-16

## 2025-06-16 RX ORDER — SODIUM CHLORIDE, SODIUM LACTATE, POTASSIUM CHLORIDE, CALCIUM CHLORIDE 600; 310; 30; 20 MG/100ML; MG/100ML; MG/100ML; MG/100ML
100 INJECTION, SOLUTION INTRAVENOUS CONTINUOUS
Status: DISCONTINUED | OUTPATIENT
Start: 2025-06-16 | End: 2025-06-16 | Stop reason: HOSPADM

## 2025-06-16 RX ORDER — OXYCODONE HYDROCHLORIDE 5 MG/1
5 TABLET ORAL EVERY 6 HOURS PRN
Qty: 10 TABLET | Refills: 0 | Status: SHIPPED | OUTPATIENT
Start: 2025-06-16

## 2025-06-16 RX ORDER — BUPIVACAINE HYDROCHLORIDE 5 MG/ML
INJECTION, SOLUTION PERINEURAL AS NEEDED
Status: DISCONTINUED | OUTPATIENT
Start: 2025-06-16 | End: 2025-06-16 | Stop reason: HOSPADM

## 2025-06-16 RX ORDER — ONDANSETRON HYDROCHLORIDE 2 MG/ML
INJECTION, SOLUTION INTRAVENOUS AS NEEDED
Status: DISCONTINUED | OUTPATIENT
Start: 2025-06-16 | End: 2025-06-16

## 2025-06-16 RX ORDER — FENTANYL CITRATE 50 UG/ML
INJECTION, SOLUTION INTRAMUSCULAR; INTRAVENOUS AS NEEDED
Status: DISCONTINUED | OUTPATIENT
Start: 2025-06-16 | End: 2025-06-16

## 2025-06-16 RX ORDER — SODIUM CHLORIDE 0.9 G/100ML
INJECTION, SOLUTION IRRIGATION AS NEEDED
Status: DISCONTINUED | OUTPATIENT
Start: 2025-06-16 | End: 2025-06-16 | Stop reason: HOSPADM

## 2025-06-16 RX ORDER — OXYCODONE HYDROCHLORIDE 5 MG/1
5 TABLET ORAL EVERY 4 HOURS PRN
Status: DISCONTINUED | OUTPATIENT
Start: 2025-06-16 | End: 2025-06-16 | Stop reason: HOSPADM

## 2025-06-16 RX ORDER — ROCURONIUM BROMIDE 10 MG/ML
INJECTION, SOLUTION INTRAVENOUS AS NEEDED
Status: DISCONTINUED | OUTPATIENT
Start: 2025-06-16 | End: 2025-06-16

## 2025-06-16 RX ORDER — AMOXICILLIN 250 MG
1 CAPSULE ORAL 2 TIMES DAILY PRN
Qty: 30 TABLET | Refills: 0 | Status: SHIPPED | OUTPATIENT
Start: 2025-06-16

## 2025-06-16 RX ORDER — METRONIDAZOLE 500 MG/100ML
INJECTION, SOLUTION INTRAVENOUS AS NEEDED
Status: DISCONTINUED | OUTPATIENT
Start: 2025-06-16 | End: 2025-06-16

## 2025-06-16 RX ORDER — HEPARIN SODIUM 5000 [USP'U]/ML
5000 INJECTION, SOLUTION INTRAVENOUS; SUBCUTANEOUS ONCE
Status: COMPLETED | OUTPATIENT
Start: 2025-06-16 | End: 2025-06-16

## 2025-06-16 RX ORDER — LIDOCAINE HCL/PF 100 MG/5ML
SYRINGE (ML) INTRAVENOUS AS NEEDED
Status: DISCONTINUED | OUTPATIENT
Start: 2025-06-16 | End: 2025-06-16

## 2025-06-16 RX ORDER — ACETAMINOPHEN 325 MG/1
975 TABLET ORAL ONCE
Status: COMPLETED | OUTPATIENT
Start: 2025-06-16 | End: 2025-06-16

## 2025-06-16 RX ORDER — ONDANSETRON 4 MG/1
4 TABLET, FILM COATED ORAL EVERY 6 HOURS PRN
Qty: 20 TABLET | Refills: 0 | Status: SHIPPED | OUTPATIENT
Start: 2025-06-16

## 2025-06-16 RX ORDER — KETOROLAC TROMETHAMINE 30 MG/ML
INJECTION, SOLUTION INTRAMUSCULAR; INTRAVENOUS AS NEEDED
Status: DISCONTINUED | OUTPATIENT
Start: 2025-06-16 | End: 2025-06-16

## 2025-06-16 RX ORDER — ACETAMINOPHEN 325 MG/1
650 TABLET ORAL EVERY 6 HOURS PRN
Qty: 30 TABLET | Refills: 0 | Status: SHIPPED | OUTPATIENT
Start: 2025-06-16

## 2025-06-16 RX ORDER — MIDAZOLAM HYDROCHLORIDE 1 MG/ML
INJECTION INTRAMUSCULAR; INTRAVENOUS AS NEEDED
Status: DISCONTINUED | OUTPATIENT
Start: 2025-06-16 | End: 2025-06-16

## 2025-06-16 RX ORDER — IBUPROFEN 600 MG/1
600 TABLET, FILM COATED ORAL EVERY 6 HOURS PRN
Qty: 30 TABLET | Refills: 0 | Status: SHIPPED | OUTPATIENT
Start: 2025-06-16

## 2025-06-16 RX ADMIN — GABAPENTIN 600 MG: 600 TABLET, FILM COATED ORAL at 06:37

## 2025-06-16 RX ADMIN — ROCURONIUM BROMIDE 20 MG: 10 INJECTION INTRAVENOUS at 09:26

## 2025-06-16 RX ADMIN — MIDAZOLAM HYDROCHLORIDE 2 MG: 2 INJECTION, SOLUTION INTRAMUSCULAR; INTRAVENOUS at 07:18

## 2025-06-16 RX ADMIN — ROCURONIUM BROMIDE 20 MG: 10 INJECTION INTRAVENOUS at 09:58

## 2025-06-16 RX ADMIN — FENTANYL CITRATE 100 MCG: 50 INJECTION, SOLUTION INTRAMUSCULAR; INTRAVENOUS at 07:26

## 2025-06-16 RX ADMIN — HYDROMORPHONE HYDROCHLORIDE 0.5 MG: 1 INJECTION, SOLUTION INTRAMUSCULAR; INTRAVENOUS; SUBCUTANEOUS at 10:44

## 2025-06-16 RX ADMIN — MAGNESIUM SULFATE HEPTAHYDRATE 2 G: 500 INJECTION, SOLUTION INTRAMUSCULAR; INTRAVENOUS at 09:38

## 2025-06-16 RX ADMIN — HYDROMORPHONE HYDROCHLORIDE 0.2 MG: 0.2 INJECTION, SOLUTION INTRAMUSCULAR; INTRAVENOUS; SUBCUTANEOUS at 11:06

## 2025-06-16 RX ADMIN — PROPOFOL 200 MG: 10 INJECTION, EMULSION INTRAVENOUS at 07:26

## 2025-06-16 RX ADMIN — LIDOCAINE HYDROCHLORIDE 80 MG: 20 INJECTION INTRAVENOUS at 07:26

## 2025-06-16 RX ADMIN — SODIUM CHLORIDE, SODIUM LACTATE, POTASSIUM CHLORIDE, AND CALCIUM CHLORIDE: 600; 310; 30; 20 INJECTION, SOLUTION INTRAVENOUS at 07:20

## 2025-06-16 RX ADMIN — KETOROLAC TROMETHAMINE 30 MG: 30 INJECTION, SOLUTION INTRAMUSCULAR; INTRAVENOUS at 10:14

## 2025-06-16 RX ADMIN — ACETAMINOPHEN 975 MG: 325 TABLET ORAL at 06:37

## 2025-06-16 RX ADMIN — OXYCODONE 5 MG: 5 TABLET ORAL at 13:35

## 2025-06-16 RX ADMIN — CELECOXIB 400 MG: 200 CAPSULE ORAL at 06:37

## 2025-06-16 RX ADMIN — HEPARIN SODIUM 5000 UNITS: 5000 INJECTION, SOLUTION INTRAVENOUS; SUBCUTANEOUS at 07:00

## 2025-06-16 RX ADMIN — ROCURONIUM BROMIDE 40 MG: 10 INJECTION INTRAVENOUS at 08:03

## 2025-06-16 RX ADMIN — DEXAMETHASONE SODIUM PHOSPHATE 10 MG: 4 INJECTION, SOLUTION INTRA-ARTICULAR; INTRALESIONAL; INTRAMUSCULAR; INTRAVENOUS; SOFT TISSUE at 08:20

## 2025-06-16 RX ADMIN — CEFAZOLIN 3 G: 1 INJECTION, POWDER, FOR SOLUTION INTRAMUSCULAR; INTRAVENOUS at 07:26

## 2025-06-16 RX ADMIN — METRONIDAZOLE 500 MG: 500 INJECTION, SOLUTION INTRAVENOUS at 07:31

## 2025-06-16 RX ADMIN — SUGAMMADEX 400 MG: 100 INJECTION, SOLUTION INTRAVENOUS at 10:29

## 2025-06-16 RX ADMIN — ROCURONIUM BROMIDE 60 MG: 10 INJECTION INTRAVENOUS at 07:26

## 2025-06-16 RX ADMIN — HYDROMORPHONE HYDROCHLORIDE 0.5 MG: 1 INJECTION, SOLUTION INTRAMUSCULAR; INTRAVENOUS; SUBCUTANEOUS at 09:36

## 2025-06-16 RX ADMIN — HYDROMORPHONE HYDROCHLORIDE 0.5 MG: 1 INJECTION, SOLUTION INTRAMUSCULAR; INTRAVENOUS; SUBCUTANEOUS at 08:15

## 2025-06-16 RX ADMIN — ONDANSETRON 4 MG: 2 INJECTION INTRAMUSCULAR; INTRAVENOUS at 10:14

## 2025-06-16 RX ADMIN — ROCURONIUM BROMIDE 30 MG: 10 INJECTION INTRAVENOUS at 08:41

## 2025-06-16 SDOH — HEALTH STABILITY: MENTAL HEALTH: CURRENT SMOKER: 0

## 2025-06-16 ASSESSMENT — PAIN SCALES - GENERAL
PAINLEVEL_OUTOF10: 4
PAINLEVEL_OUTOF10: 4
PAINLEVEL_OUTOF10: 6
PAINLEVEL_OUTOF10: 3
PAINLEVEL_OUTOF10: 4
PAINLEVEL_OUTOF10: 7
PAINLEVEL_OUTOF10: 2
PAINLEVEL_OUTOF10: 0 - NO PAIN
PAINLEVEL_OUTOF10: 7

## 2025-06-16 ASSESSMENT — PAIN - FUNCTIONAL ASSESSMENT
PAIN_FUNCTIONAL_ASSESSMENT: 0-10
PAIN_FUNCTIONAL_ASSESSMENT: UNABLE TO SELF-REPORT
PAIN_FUNCTIONAL_ASSESSMENT: 0-10

## 2025-06-16 ASSESSMENT — PAIN DESCRIPTION - DESCRIPTORS
DESCRIPTORS: CRAMPING
DESCRIPTORS: CRAMPING

## 2025-06-16 NOTE — ANESTHESIA PREPROCEDURE EVALUATION
Patient: Karlee Beck    Procedure Information       Anesthesia Start Date/Time: 06/16/25 0720    Procedure: Total robotic hysterectomy, bilateral salpingectomy, any other indicated procedures    Location: [facility] OR 02 / [facility]    Surgeons: Demetria Arreguin MD, MS            Relevant Problems   Cardiac   (+) Heart murmur   (+) Primary hypertension      GI   (+) Chronic GERD      /Renal   (+) Urinary tract infection      Endocrine   (+) Goiter   (+) Morbid (severe) obesity due to excess calories (Multi)   (+) Multiple thyroid nodules   (+) Nontoxic multinodular goiter   (+) Subclinical hypothyroidism      ID   (+) Urinary tract infection      GYN   (+) Multigravida of advanced maternal age (HHS-HCC)   (+) PCOS (polycystic ovarian syndrome)       Clinical information reviewed:   Tobacco  Allergies  Meds   Med Hx  Surg Hx  OB Status  Fam Hx  Soc   Hx        NPO Detail:  NPO/Void Status  Date of Last Liquid: 06/16/25  Time of Last Liquid: 0000  Date of Last Solid: 06/14/25  Time of Last Solid: 0000         Physical Exam    Airway  Mallampati: I  TM distance: >3 FB  Neck ROM: full     Cardiovascular - normal exam   Dental - normal exam     Pulmonary - normal exam   Abdominal - normal exam           Anesthesia Plan    History of general anesthesia?: yes  History of complications of general anesthesia?: no    ASA 2     general     The patient is not a current smoker.  Patient was not previously instructed to abstain from smoking on day of procedure.  Patient did not smoke on day of procedure.    intravenous induction   Postoperative pain plan includes opioids.  Anesthetic plan and risks discussed with patient.  Use of blood products discussed with patient who.    Plan discussed with CAA.

## 2025-06-16 NOTE — ANESTHESIA PROCEDURE NOTES
Airway  Date/Time: 6/16/2025 7:30 AM  Reason: elective    Airway not difficult    Staffing  Performed: ESTHER   Authorized by: Anthony Celaya MD    Performed by: ESTHER Allen  Patient location during procedure: OR    Patient Condition  Indications for airway management: anesthesia  Patient position: sniffing  Planned trial extubation  Sedation level: deep     Final Airway Details   Preoxygenated: yes  Final airway type: endotracheal airway  Successful airway: ETT  Cuffed: yes   Successful intubation technique: direct laryngoscopy  Adjuncts used in placement: intubating stylet  Endotracheal tube insertion site: oral  Blade: Abiel  Blade size: #3  ETT size (mm): 7.0  Cormack-Lehane Classification: grade IIa - partial view of glottis  Placement verified by: capnometry   Number of attempts at approach: 1

## 2025-06-16 NOTE — H&P
Gynecologic Oncology H&P    Karlee Beck is a 40 y.o. 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   PMHx notable for HTN on amlodipine, PCOS on metformin and ozempic (held prior to surgery), obesity (BMI 55), anxiety, gallstones, GERD, nontoxic goiter.    Pre-op labs: (6/11) Hgb12.9, plts 345, Cr 0.69  PAT (6/11): Caprini score 11, high risk of perioperative VTE.     -    GYO History:    Tumor History:  - 9/2020: CAH bordering on G1 EAC, on Megace  - 1/2021: EMB with progesterone effect   - 4/2021: EMB with progesterone effect  - 11/2021: EMB with hyperplasia without atypia, MARIA EUGENIA 3 noted underwent subsequent pap and ECC which were wnl  - 4/2022: EMB with secretory endometrium   - 10/22: Focal endometrial hyperplasia  - 2/23: EMB benign  - 11/23 EMB benign  - 5/2024 EMB benign    [facility] Reports:    5/14/2024:   A. ENDOMETRIUM, BIOPSY:   -- Superficial fragments of proliferative pattern endometrium with tubal metaplasia; too scant to further evaluate, see comment    11/07/2023:  A. ENDOMETRIUM, BIOPSY:   -- Scant proliferative pattern endometrium with tubal and eosinophilic metaplasia and stromal breakdown; too fragmented to further evaluate, see note  -- Scant endocervical mucosa with chronic inflammation and cytologically normal squamous epithelium     Note: No intact fragments of endometrium are identified, limiting definitive evaluation for hyperplasia.  No significant cytologic atypia is noted.  There is no histologic evidence of exogenous progestin effect.     9/2020  A.  ENDOMETRIUM, BIOPSY:   -- ATYPICAL ENDOMETRIAL HYPERPLASIA WITH SQUAMOUS MORULAR METAPLASIA, BORDERING   ON FIGO GRADE 1 ENDOMETRIOID ADENOCARCINOMA.     Past Medical History:   has a past medical history of Anxiety, Body mass index (BMI)  50.0-59.9, adult (Multi), Calculus of gallbladder without cholecystitis without obstruction (2019), Endometrial hyperplasia, Endometrial intraepithelial neoplasia (EIN), GERD (gastroesophageal reflux disease), H/O thyroid nodule, gestational diabetes, Impaired fasting glucose, Insulin resistance, Morbid (severe) obesity due to excess calories (Multi), Nontoxic multinodular goiter, Obesity, Class III, BMI 40-49.9 (morbid obesity), PCOS (polycystic ovarian syndrome), Personal history of gestational diabetes (2019), Polycystic ovarian disease, Pre-diabetes, Primary hypertension, and Single liveborn infant, delivered by  (2019).    Surgical History:  Past Surgical History:   Procedure Laterality Date    CERVICAL POLYPECTOMY       SECTION, LOW TRANSVERSE  [id number]    CHOLECYSTECTOMY      COLONOSCOPY      OTHER SURGICAL HISTORY  2020    Uterine polypectomy    WISDOM TOOTH EXTRACTION          OB/GYN history:   4 para 1031    OB History    Para Term  AB Living   1 1 1      SAB IAB Ectopic Multiple Live Births             # Outcome Date GA Lbr Zeeshan/2nd Weight Sex Type Anes PTL Lv   1 Term                Social History:  Social History     Socioeconomic History    Marital status:    Tobacco Use    Smoking status: Never     Passive exposure: Never    Smokeless tobacco: Never   Substance and Sexual Activity    Alcohol use: Yes     Alcohol/week: 2.0 standard drinks of alcohol     Types: 1 Cans of beer, 1 Standard drinks or equivalent per week     Comment: Not regular drinker    Drug use: Not Currently    Sexual activity: Yes     Partners: Male     Birth control/protection: None     Comment: - cant get pregnant.        Family History:  Family History   Problem Relation Name Age of Onset    COPD Mother Vanessa     Diabetes Mother Vanessa     Hypertension Mother Vanessa     Hypertension Father Christian     Drug abuse Sister Aditi     Eclampsia Sister Aditi       labor Sister Aditi     Drug abuse Brother Tono     Breast cancer Mother's Sister Ivan     Cancer Mother's Sister Ivan     COPD Maternal Grandmother Avelina     Diabetes Maternal Grandmother Avelina     Cancer Maternal Grandmother Avelina     Cancer Paternal Grandmother Janelle     Breast cancer Sister Maternal aunt ivan- not my sister         Medications:  Prior to Admission medications    Medication Sig Start Date End Date Taking? Authorizing Provider   amLODIPine (Norvasc) 5 mg tablet Take 1 tablet (5 mg) by mouth once daily. 3/10/25   LASHAY Michel-SHAGUFTA   medroxyPROGESTERone (Provera) 10 mg tablet Take 1 tablet (10 mg) by mouth once daily. 24  Amaya Kirk MD   metFORMIN  mg 24 hr tablet 3 pills daily, Do not crush, chew, or split. 25   Carlos Russo MD   multivit-min/ferrous fumarate (MULTI VITAMIN ORAL) 0 Refill(s), Type: Maintenance 18   Historical Provider, MD   semaglutide (Ozempic) 1 mg/dose (4 mg/3 mL) pen injector Inject 1 mg under the skin 1 (one) time per week. 24  Carlos Russo MD       Allergies:  Azithromycin and Potassium sulfate    Objective  There were no vitals taken for this visit.     Physical exam:   General: no acute distress  HEENT: normocephalic, atraumatic  CV: warm and well perfused  Lungs: breathing comfortably on room air  Abdomen: soft, non-tender  Extremities: moving all extremities spontaneously  Neuro: awake and conversant  Psych: appropriate mood and affect      Lab Review  Lab Results   Component Value Date    WBC 7.8 2025    HGB 12.9 2025    HCT 41.8 2025     2025    CHOL 169 2025    TRIG 70 2025    HDL 46 (L) 2025    ALT 15 2025    AST 14 2025     2025    K 5.2 2025     2025    CREATININE 0.69 2025    BUN 11 2025    CO2 23 2025    TSH 3.51 2025    HGBA1C 5.6 2025         Imaging  See Tumor  History above    Assessment & Plan     Karlee Beck is a 40 y.o. with atypical endometrial hyperplasia who desires definitive treatment with total robotic hysterectomy, bilateral salpingectomy, any other indicated procedures.     Plan to proceed with procedure.    Anticipated discharge plan: anticipate same day discharge     To be seen and d/w Dr. Arreguin    Obstetrics & Gynecology   Gynecologic Oncology  Pager [phone]  Phone [phone]

## 2025-06-16 NOTE — ANESTHESIA POSTPROCEDURE EVALUATION
Patient: Karlee Beck    Procedure Summary       Date: 06/16/25 Room / Location: [facility] OR 02 / [facility] OR    Anesthesia Start: 0720 Anesthesia Stop: 1040    Procedure: Total robotic hysterectomy, bilateral salpingectomy, lysis of adhesions (Abdomen) Diagnosis:       Endometrial hyperplasia      (Endometrial hyperplasia [N85.00])    Surgeons: Demetria Arreguin MD, MS Responsible Provider: Anthony Celaya MD    Anesthesia Type: general ASA Status: 2            Anesthesia Type: general    Vitals Value Taken Time   /79 06/16/25 10:40   Temp 36.2 06/16/25 10:40   Pulse 93 06/16/25 10:40   Resp 16 06/16/25 10:40   SpO2 100 06/16/25 10:40       Anesthesia Post Evaluation    Patient location during evaluation: PACU  Patient participation: complete - patient participated  Level of consciousness: awake and alert  Pain management: adequate  Airway patency: patent  Cardiovascular status: hemodynamically stable and acceptable  Respiratory status: spontaneous ventilation and room air  Hydration status: acceptable  Postoperative Nausea and Vomiting: none        There were no known notable events for this encounter.

## 2025-06-16 NOTE — OP NOTE
Date: 2025  OR Location: [facility] OR    Name: Karlee Beck, : 1985, Age: 40 y.o., MRN: [id number], Sex: female    Diagnosis  Pre-op Diagnosis      * Endometrial hyperplasia [N85.00] Post-op Diagnosis     * Endometrial hyperplasia [N85.00]     Procedures  Total robotic hysterectomy, bilateral salpingectomy, lysis of adhesions  82325 - FL LAPAROSCOPY W TOTAL HYSTERECTOMY UTERUS 250 GM/<    Total robotic hysterectomy, bilateral salpingectomy, lysis of adhesions   - FL SURGICAL TECHNIQUES REQUIRING USE OF ROBOTIC SURGICAL SYSTEM (LIST SEPARATELY IN ADDITION TO CODE FOR PRIMARY PROCEDURE)      Surgeons      * Demetria Arreguin - Primary    Resident/Fellow/Other Assistant:  Surgeons and Role:     * Hi Briseno MD - Fellow  Tatiana Dangelo MD PGY-3  Jud Villalba MD PGY-2    Staff:   Circulator: Aramis Alva Person: Lobar  Relief Scrub: Jacqueline    Anesthesia Staff: Anesthesiologist: Anthony Celaya MD  C-AA: ESTHER Allen    Procedure Summary  Anesthesia: General  ASA: II  Estimated Blood Loss: 25mL  Intra-op Medications:   Administrations occurring from 0645 to 1150 on 25:   Medication Name Total Dose   surgical lubricant gel 1 Application   sodium chloride 0.9 % irrigation solution 2,000 mL   BUPivacaine HCl (Marcaine) 0.5 % (5 mg/mL) injection 10 mL   ceFAZolin (Ancef) vial 1 g 3 g   dexAMETHasone (Decadron) 4 mg/mL IV Syringe 2 mL 10 mg   fentaNYL (Sublimaze) injection 50 mcg/mL 100 mcg   HYDROmorphone (Dilaudid) injection 1 mg/mL 1 mg   ketorolac (Toradol) injection 30 mg 30 mg   LR bolus Cannot be calculated   lidocaine (cardiac) injection 2% prefilled syringe 80 mg   magnesium sulfate 50 % injection 2 g   metroNIDAZOLE (Flagyl)  mg in 100 mL NaCl (iso) - premix 500 mg   midazolam PF (Versed) injection 1 mg/mL 2 mg   ondansetron (Zofran) 2 mg/mL injection 4 mg   propofol (Diprivan) injection 10 mg/mL 200 mg   rocuronium (ZeMuron) 50 mg/5 mL injection 170 mg   heparin  (porcine) injection 5,000 Units 5,000 Units              Anesthesia Record               Intraprocedure I/O Totals       None           Specimen:   ID Type Source Tests Collected by Time   1 : cervix, uterus, b/l fallopian tubes Tissue UTERUS, CERVIX, AND BILATERAL FALLOPIAN TUBES SURGICAL PATHOLOGY EXAM Demetria Arreguin MD, MS 6/16/2025 0844             Procedure Details:  The patient was seen in the preoperative area. The site of surgery was properly noted/marked if necessary per policy. The patient has been actively warmed in preoperative area. Preoperative antibiotics have been ordered and given within 1 hours of incision. Venous thrombosis prophylaxis have been ordered including bilateral sequential compression devices and chemical prophylaxis    Findings: Normal abdominal survey, omental adhesion present at umbilicus, fibrous bladder adhesions present on anterior uterine wall. Normal appearing bilateral fallopian tube and ovaries. Smooth peritoneal surfaces, normal omentum.     The patient was identified in preoperative holding area with correct name and medical record number. Informed consent was reviewed and all remaining questions answered. The patient was taken to the operating room where she had sequential compression devices  placed and received perioperative antibiotics.  She was placed under general anesthesia  without complications and then positioned in low lithotomy position.  After sterile prep and draping, a Wagner catheter was placed.  A single-tooth tenaculum was placed on the anterior lip of the cervix. The cervix was sequentially dilated and a medium V-Care uterine manipulator was placed without difficulty. The tenaculum and speculum were removed from the vagina.     Attention was then turned to the patient's abdomen where a 5 mm incision was made in the LUQ. A 5 mm port was guided intraabdominal by direct optical technique. Entry site was inspected and surveillance of the abdomen performed,  demonstrating no evidence of bowel or vascular injury at entry. Findings are as documented, with omental adhesion at the umbilicus. The decision was made to proceed with placement of the robotic trocars: two 8mm right-sided ports, a supraumbilical port and a left lateral robotic port were placed under direct visualization without difficulty at the level of the umbilicus. At this point, the patient was placed in steep Trendelenburg position.  The bowel was easily swept out of the pelvis. Robot was docked without any difficulty.      The uterus was then anteverted. The right fallopian tube was identified and transected using bipolar and monopolar scissors. This was then completed on the left side.    We then proceeded with completion of the hysterectomy. Adhesion of the left colon to pelvic sidewall were present and these were carefully dissected using monopolar scissors. The round ligament was transected on the left side using monopolar scissors. A bladder flap was then created anteriorly on the left side using monopolar scissors as well. Fibrous bladder adhesions present at the anterior uterine wall and these were carefully dissected using monopolar scissors. After bladder flap dissected and skeletonizing the uterine artery, it was taken with bipolar device.  The cardinal and uterosacral ligaments were divided using bipolar and monopolar devices, with each pedicle created medial to the previous to prevent ureteral injury.    Attention was then turned to the patient's right side where in a similar fashion, the steps were repeated including desiccating and transecting the round ligament and dividing and sealing the infundibulopelvic ligament. The bladder flap was also created on the right side.  The uterine arteries were skeletonized and were taken using fenestrated bipolar device.  The cardinal and uterosacral ligaments were divided in a similar manner as the left side.  At this point, the uterine manipulator was  clearly palpated with the robotic arm and the cervicovaginal junction was incised circumferentially with monopolar scissors without any difficulty.  The uterus with ovaries and fallopian tubes was then delivered through the vagina without any difficulty and was sent for frozen section.  At this point, all pedicles were inspected and were completely hemostatic.     The vaginal cuff was re-approximated using #0 V-lock suture in a running fashion.  Copious irrigation was performed and no additional bleeding was noted. Pneumoperitoneum was reduced to 8 mmHg and hemostasis was appreciated.    The robot was undocked. The Wagner catheter was then removed. After reversal of the pneumoperitoneum and removal of all ports, the skin incisions were closed with 4-0 Monocryl in a subcuticular fashion. Steristrips and a sterile dressing was placed over each of the closed incision sites. The patient tolerated the procedure well.    Sponge, lap, and needle counts were correct x2. She was reversed from her general anesthesia and was taken to the recovery room in stable condition.       Complications:  None; patient tolerated the procedure well.     Disposition: PACU - hemodynamically stable.  Condition: stable    I was present for the entirety of the procedure(s).     Demetria Arreguin MD, MS

## 2025-06-16 NOTE — ANESTHESIA PROCEDURE NOTES
Peripheral IV  Date/Time: 6/16/2025 7:55 AM  Inserted by: Anthony Celaya MD    Placement  Needle size: 18 G  Laterality: left  Location: antecubital  Site prep: alcohol  Technique: anatomical landmarks

## 2025-06-16 NOTE — DISCHARGE INSTRUCTIONS
Laparoscopic Hysterectomy Discharge Instructions  If you have any questions about your care, please contact us at [phone].    Medications and Pain Management  Common areas of pain after laparoscopic hysterectomy include the incision pain, pain in between your shoulder blades, the pelvis and lower back. The gas that was used to distend your abdomen for the surgery is absorbed slowly into your blood stream over the first 3-4 days after surgery. It is not passed intestinally, although, because your abdomen is distended, it may feel similar to intestinal gas. Staying active and walking is the best way to promote the absorption of this gas.  Immediately after surgery, nerve pain is the most intense, typically for the first 6 to 12 hours. As the body heals, it creates inflammation around the incisions sites adding pressure and creating soreness. After 5 days, the inflammation begins to recede and significant improvement in soreness is expected. Pulling on the incisions, especially if sudden, such as when you cough, will reactivate the nerve pain. Support your abdomen with a pillow during coughing or sneezing as this will be helpful to minimize pain. There are two types of pain pills typically used for post-operative pain management, narcotics such as tramadol and an anti-inflammatory such as Ibuprofen or Naprosyn.  Taking regular anti-inflammatory pills, such as 600mg of Ibuprofen every 6 hours for the first 5 days and then as needed is recommended. You can alternate ibuprofen with tylenol (975mg or 1000mg). The tylenol can be taken every 6 hours.  If you have problems using NSAIDs, be sure to discuss this with your doctor. The narcotic can be used on a schedule for the first 1 to 2 days but after that, only as you need it. Narcotics can cause constipation, nausea, sleepiness and headaches. You may begin your usual home medications as you were taking before unless directed by your doctor.    Incisional  care  Paper tape steri-strips are typically used for the abdominal incisions. The steri-strips will fall off on their own or can be removed at your first post-operative appointment. You may shower and use a mild soap around the incisions and pat dry. Do not use a washcloth or scrub the incisions. Using peroxide or antiseptics is not recommended for routine care. Avoid hot and steamy showers as this may cause you to feel faint. No tub baths for six weeks following surgery. There may be discoloration or bruising around the incisions. This is normal and may take several weeks to resolve. Firmness or a nodular area under the skin near the incision may represent a collection of blood, this too will resolve on its own after a little time. If any incision develops tenderness, redness in the skin layer or has drainage please call the office.    Vaginal Discharge  You may have a mildly malodorous discharge and occasional spotting for up to 6 weeks. Do not put anything in the vagina like tampons or have sexual intercourse for 6 weeks after surgery. If you are having bleeding like a period, that is abnormal and you should contact your doctor.    GI Function, Nausea and Constipation  Nausea can occasionally be an issue in the first few days after surgery. It is usually caused as a side effect from the anesthesia and pain medicine, particularly narcotics. Taking the pain pills with food is a food way to proactively minimize this. Throwing up, especially after the first day, is not expected and if this happens, you should call your doctor. Feeling gassy and constipation can be a problem for the first week after surgery. Limiting the use of narcotics may be helpful. Stool softeners twice a day and a high fiber diet are safe. If needed, Miralax once daily is a good choice. If no bowel movement after 3 days, you will need to increase the Miralax until soft, regular bowel movements are passing.    Urinating  Because the bladder is  disturbed by the surgery, the normal sensation may be temporarily altered. You may not be aware that your bladder is full. If the bladder is allowed to get over distended, it may make the problem worse. This is why we make sure that you are able to empty your bladder adequately before you go home. For the first few days at home, you should make a point to empty your bladder every 3 to 4 hours. Pain with voiding, especially after the first day, is not expected and may represent a bladder infection.    Activity  For the first two days post-operatively, your soreness and recovery from anesthesia will limit your activity  Stairs are safe, just take your time  At a minimum during this time, you should walk around for 10-15 minutes every 2-3 hours. After that, in the first week, any activity except for overt exercise is safe.   During the first week you should not commit to being on your feet for more than 30 minutes at a time.   During the second week, light exercise is encouraged.  After 2 weeks from surgery, you should try to get back into regular activity other than heavy lifting.   For healing, please limit the amount of weight lifted to 8-10 pounds (a gallon of milk) for the first 6 weeks after surgery.   Driving is usually okay after the first few days. You must be able to comfortably wear a seatbelt, press the gas/brake pedals, and drive defensively. You may not drive while taking narcotic pain medicines.    When to Call the Doctor  Call for any fever above 100.4 F (If you do not feel feverish you do not have to routinely check your temperature.)  Call for severe pain not improved by medications  Call for persistent nausea, vomiting  Call for vaginal bleeding that is heavy as a period or passing blood clots larger than a quarter  Call for unusual swelling in your legs  Call if the incisions develop painful redness and discharge    In an emergency, call 911 or go to an Emergency Department at a nearby hospital

## 2025-06-30 DIAGNOSIS — N85.00 ENDOMETRIAL HYPERPLASIA: ICD-10-CM

## 2025-06-30 RX ORDER — MEDROXYPROGESTERONE ACETATE 10 MG/1
10 TABLET ORAL DAILY
Qty: 30 TABLET | Refills: 11 | Status: SHIPPED | OUTPATIENT
Start: 2025-06-30

## 2025-07-07 NOTE — PROGRESS NOTES
"Patient ID: Karlee Beck is a 40 y.o. female.  Referring Physician: No referring provider defined for this encounter.  Primary Care Provider: IRENA Michel    Subjective    Interval History:  She is concerned about one of the incision under her steri strip. Otherwise the analgesics helped her for 4 days p/o, noting pink discharge, mild pelvic cramping, and headaches. She started walking in her neighborhood and is experiencing fatigue.    She denies fever, chills, chest pain, SOB, nausea, vomiting, diarrhea, constipation, dysuria, or any other concerning signs of symptoms.      Tumor History:  - 2020: CAH bordering on G1 EAC, on Megace  - 2021: EMB with progesterone effect   - 2021: EMB with progesterone effect  - 2021: EMB with hyperplasia without atypia, MARIA EUGENIA 3 noted underwent subsequent pap and ECC which were wnl  - 2022: EMB with secretory endometrium   - 10/22: Focal endometrial hyperplasia  - : EMB benign  -  EMB benign  - 2024 EMB benign  - 2025: TLH/BS     Past medical history:  PCO S or \"prediabetes\".  Hypothyroid, treated medically  GERD  Morbid obesity        Prior surgery  Cholecystectomy  [place] teeth removal   section     OB/GYN history:   4 para 1031  Possible polycystic ovary syndrome.  Desires fertility preservation.  Would like to have another child.  Endometrial polyp suspected on saline sonohysterogram  and later undergoing polypectomy showing background endometrium with hyperplasia without atypia in May 2020  Began Provera 10 mg per day in 2020 after being referred to Dr. Evette Fournier  Endometrial biopsy 2020 showing atypical endometrial hyperplasia bordering on grade 1 cancer.     Family history:  Paternal grandmother with brain cancer  Maternal aunt with breast cancer, possibly premenopausal    Objective    BSA: There is no height or weight on file to calculate BSA.  LMP  (LMP Unknown)      Physical Exam  Constitutional:       " Appearance: Normal appearance. She is normal weight.   HENT:      Head: Normocephalic.      Mouth/Throat:      Mouth: Mucous membranes are moist.   Eyes:      Pupils: Pupils are equal, round, and reactive to light.   Cardiovascular:      Rate and Rhythm: Normal rate and regular rhythm.      Heart sounds: No murmur heard.     No friction rub. No gallop.   Pulmonary:      Effort: Pulmonary effort is normal.      Breath sounds: Normal breath sounds.   Abdominal:      General: Abdomen is flat. Bowel sounds are normal.      Palpations: Abdomen is soft.   Musculoskeletal:         General: No swelling.   Skin:     General: Skin is warm and dry.   Neurological:      Mental Status: She is alert.       Surgical Pathology Exam: [id number]  Order: [id number]   Collected 6/16/2025 08:44       Status: Final result       Dx: Endometrial hyperplasia    Test Result Released: Yes (seen)    0 Result Notes       View Follow-Up Encounter      Component    FINAL DIAGNOSIS   A. UTERUS, CERVIX, AND BILATERAL FALLOPIAN TUBES, HYSTERECTOMY AND BILATERAL SALPINGECTOMY:  -- Endometrial polyp and weakly proliferative endometrium, see comment  -- Myometrium with adenomyosis  -- Cervix with no pathologic diagnosis   -- Bilateral fallopian tubes with no pathologic diagnosis          Performance Status:  Asymptomatic    Assessment/Plan   40 y.o. 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 Last EMB benign now s/p TLH/BS 6/16/25 with benign pathology     # Atypical hyperplasia  - s/p TLH/BS with benign pathology    # Post-op  - Recovering well  - Discussed ongoing restrictions (no lifting more than 10-15lbs, nothing per vagina, no soaking) for another 3 weeks   - Follow-up as needed       Scribe Attestation  By signing my name below, I, Brandee Jean, Scribe   attest that this documentation has been prepared under the direction and in the presence of  Demetria Arreguin MD, MS.     Provider Attestation - Scribe documentation    All medical record entries made by the Scribe were at my direction and personally dictated by me. I have reviewed the chart and agree that the record accurately reflects my personal performance of the history, physical exam, discussion and plan.    Demetria Arreguin MD, MS

## 2025-07-08 ENCOUNTER — OFFICE VISIT (OUTPATIENT)
Dept: GYNECOLOGIC ONCOLOGY | Facility: CLINIC | Age: 40
End: 2025-07-08
Payer: COMMERCIAL

## 2025-07-08 VITALS
DIASTOLIC BLOOD PRESSURE: 96 MMHG | SYSTOLIC BLOOD PRESSURE: 148 MMHG | BODY MASS INDEX: 54.52 KG/M2 | HEART RATE: 77 BPM | OXYGEN SATURATION: 96 % | RESPIRATION RATE: 16 BRPM | TEMPERATURE: 97.7 F | WEIGHT: 293 LBS

## 2025-07-08 DIAGNOSIS — N85.00 ENDOMETRIAL HYPERPLASIA: Primary | ICD-10-CM

## 2025-07-08 PROCEDURE — 99211 OFF/OP EST MAY X REQ PHY/QHP: CPT | Performed by: STUDENT IN AN ORGANIZED HEALTH CARE EDUCATION/TRAINING PROGRAM

## 2025-07-08 PROCEDURE — 3080F DIAST BP >= 90 MM HG: CPT | Performed by: STUDENT IN AN ORGANIZED HEALTH CARE EDUCATION/TRAINING PROGRAM

## 2025-07-08 PROCEDURE — 3077F SYST BP >= 140 MM HG: CPT | Performed by: STUDENT IN AN ORGANIZED HEALTH CARE EDUCATION/TRAINING PROGRAM

## 2025-07-08 PROCEDURE — 1036F TOBACCO NON-USER: CPT | Performed by: STUDENT IN AN ORGANIZED HEALTH CARE EDUCATION/TRAINING PROGRAM

## 2025-07-08 ASSESSMENT — PAIN SCALES - GENERAL: PAINLEVEL_OUTOF10: 0-NO PAIN

## 2025-07-24 ENCOUNTER — TELEPHONE (OUTPATIENT)
Dept: GYNECOLOGIC ONCOLOGY | Facility: HOSPITAL | Age: 40
End: 2025-07-24
Payer: COMMERCIAL

## 2025-07-24 NOTE — TELEPHONE ENCOUNTER
Return to work letter for date of 7/28 completed and emailed to patient at [email or web address]

## 2025-08-18 ENCOUNTER — APPOINTMENT (OUTPATIENT)
Dept: ENDOCRINOLOGY | Facility: CLINIC | Age: 40
End: 2025-08-18
Payer: COMMERCIAL

## 2025-08-18 VITALS
HEART RATE: 78 BPM | WEIGHT: 293 LBS | DIASTOLIC BLOOD PRESSURE: 87 MMHG | SYSTOLIC BLOOD PRESSURE: 124 MMHG | BODY MASS INDEX: 54.52 KG/M2

## 2025-08-18 DIAGNOSIS — E28.2 PCOS (POLYCYSTIC OVARIAN SYNDROME): ICD-10-CM

## 2025-08-18 DIAGNOSIS — E04.1 THYROID NODULE: ICD-10-CM

## 2025-08-18 DIAGNOSIS — R73.01 IMPAIRED FASTING GLUCOSE: ICD-10-CM

## 2025-08-18 PROCEDURE — 99213 OFFICE O/P EST LOW 20 MIN: CPT | Performed by: INTERNAL MEDICINE

## 2025-08-18 ASSESSMENT — PAIN SCALES - GENERAL: PAINLEVEL_OUTOF10: 0-NO PAIN

## 2025-08-18 ASSESSMENT — ENCOUNTER SYMPTOMS: DEPRESSION: 0

## 2025-09-10 ENCOUNTER — APPOINTMENT (OUTPATIENT)
Dept: PRIMARY CARE | Facility: CLINIC | Age: 40
End: 2025-09-10
Payer: COMMERCIAL

## 2025-09-15 ENCOUNTER — APPOINTMENT (OUTPATIENT)
Dept: DERMATOLOGY | Facility: CLINIC | Age: 40
End: 2025-09-15
Payer: COMMERCIAL

## 2026-02-18 ENCOUNTER — APPOINTMENT (OUTPATIENT)
Facility: CLINIC | Age: 41
End: 2026-02-18
Payer: COMMERCIAL

## (undated) DEVICE — MANIFOLD, 4 PORT NEPTUNE STANDARD

## (undated) DEVICE — KIT, ROBOTIC, CUSTOM UHC

## (undated) DEVICE — SUTURE, MONOCRYL, 4-0, 27 IN, PS-2, UNDYED

## (undated) DEVICE — ACCESS PORT, 8M M, LOW PROFILE W/BLADELESS OPTICAL TIP, 100MM LENGTH

## (undated) DEVICE — RETRACTOR, CERVICAL CUP, VCARE, STANDARD

## (undated) DEVICE — COVER, TIP HOT SHEARS ENDOWRIST

## (undated) DEVICE — STRIP, SKIN CLOSURE, STERI STRIP, REINFORCED, 0.5 X 4 IN

## (undated) DEVICE — SUTURE, VICRYL, 0, 27 IN, UR-6, VIOLET

## (undated) DEVICE — TOWELS 4-PK

## (undated) DEVICE — IRRIGATION SET, CYSTOSCOPY, F/CONSTANT/INTERMITTENT, 8 GTT/CC, 77 IN

## (undated) DEVICE — CATHETER TRAY, SURESTEP, 16FR, URINE METER W/STATLOCK

## (undated) DEVICE — SEAL, UNIVERSAL, 5-12MM

## (undated) DEVICE — DRAPE, TIBURON W/ADHESIVE, 19 X 30

## (undated) DEVICE — DRAPE, ARM XI

## (undated) DEVICE — GOWN, SURGICAL, SMARTGOWN, XLARGE, STERILE

## (undated) DEVICE — DRIVER, NEEDLE, MEGA SUTURE CUT, DAVINCI XI

## (undated) DEVICE — SYRINGE, 60ML, FASTURN W/QUICK FILL TUBE

## (undated) DEVICE — DRAPE PACK, LAVH, W/ATTACHED LEGGINGS, W/POUCH, 100 X 114 IN, LF, STERILE

## (undated) DEVICE — PREP TRAY, SKIN, DRY, W/GLOVES

## (undated) DEVICE — OBTURATOR, BLADELESS  8MM

## (undated) DEVICE — DRAPE, COLUMN, DAVINCI XI

## (undated) DEVICE — FORCEPS, BIPOLAR FENESTRATED XI

## (undated) DEVICE — DRESSING, ADHESIVE, ISLAND, TELFA, 2 X 3.75 IN, LF

## (undated) DEVICE — SUTURE, V-LOC, 0, 12IN, GS-21, GR 180 ABS

## (undated) DEVICE — TUBING SET, TRI-LUMEN, FILTERED, F/AIRSEAL

## (undated) DEVICE — FORCEPS, PROGRASP, DAVINCI XI

## (undated) DEVICE — POSITIONING, THE PINK PAD, PIGAZZI SYSTEM

## (undated) DEVICE — DRAPE, PAD, PREP, W/ 9 IN CUFF, 24 X 41, LF, NS